# Patient Record
Sex: MALE | Race: WHITE | NOT HISPANIC OR LATINO | Employment: FULL TIME | URBAN - METROPOLITAN AREA
[De-identification: names, ages, dates, MRNs, and addresses within clinical notes are randomized per-mention and may not be internally consistent; named-entity substitution may affect disease eponyms.]

---

## 2017-03-29 ENCOUNTER — OFFICE VISIT (OUTPATIENT)
Dept: MEDICAL GROUP | Age: 67
End: 2017-03-29
Payer: COMMERCIAL

## 2017-03-29 VITALS
OXYGEN SATURATION: 98 % | HEIGHT: 73 IN | HEART RATE: 95 BPM | RESPIRATION RATE: 16 BRPM | WEIGHT: 171.6 LBS | DIASTOLIC BLOOD PRESSURE: 84 MMHG | TEMPERATURE: 97.6 F | SYSTOLIC BLOOD PRESSURE: 148 MMHG | BODY MASS INDEX: 22.74 KG/M2

## 2017-03-29 DIAGNOSIS — M19.90 ARTHRITIS: ICD-10-CM

## 2017-03-29 DIAGNOSIS — E89.0 POSTSURGICAL HYPOTHYROIDISM: ICD-10-CM

## 2017-03-29 DIAGNOSIS — Z13.220 SCREENING FOR LIPID DISORDERS: ICD-10-CM

## 2017-03-29 PROCEDURE — 99214 OFFICE O/P EST MOD 30 MIN: CPT | Performed by: INTERNAL MEDICINE

## 2017-03-29 ASSESSMENT — PAIN SCALES - GENERAL: PAINLEVEL: NO PAIN

## 2017-03-29 ASSESSMENT — PATIENT HEALTH QUESTIONNAIRE - PHQ9: CLINICAL INTERPRETATION OF PHQ2 SCORE: 0

## 2017-03-29 NOTE — ASSESSMENT & PLAN NOTE
Patient reported that he started noticed mild achy pain on both knees for a few months. He reported that his ski a lot and he skis in advance level. He reported achy on the foci of the knee after exercise. Pain increases in the morning when he wakes up. He takes over-the-counter aspirin 300 mg as needed when he has pain. Pain improved with aspirin. He does not like to take medication in regular basis. And he stated that his pain is not severe enough to be on medication, but he wants to discuss this issue with me. He denies side effects from taking aspirin.

## 2017-03-29 NOTE — MR AVS SNAPSHOT
"        Noe Burr   3/29/2017 8:20 AM   Office Visit   MRN: [id number]    Department:  [address] [place] [address]   Dept Phone:  [phone]    Description:  Male : 1950   Provider:  Shari Sheppard M.D.           Reason for Visit     Follow-Up lab review      Allergies as of 3/29/2017     No Known Allergies      You were diagnosed with     Arthritis   [261783]       Postsurgical hypothyroidism   [244.0.ICD-9-CM]   Well-controlled. Continue current regimen. We will recheck TSH and free T4 in 5 months.    Elevated BP   [799468]       Screening for lipid disorders   [0491898]         Vital Signs     Blood Pressure Pulse Temperature Respirations Height Weight    148/84 mmHg 95 36.4 °C (97.6 °F) 16 1.854 m (6' 0.99\") 77.837 kg (171 lb 9.6 oz)    Body Mass Index Oxygen Saturation Smoking Status             22.64 kg/m2 98% Never Smoker          Basic Information     Date Of Birth Sex Race Ethnicity Preferred Language    1950 Male White Non- English      Your appointments     Mar 29, 2018  8:20 AM   ANNUAL EXAM PREVENTATIVE with Shari Sheppard M.D.   [address])    [address] [place] [id number] [phone]              Problem List              ICD-10-CM Priority Class Noted - Resolved    Postsurgical hypothyroidism E89.0   2016 - Present    Elevated BP I10   2016 - Present    Patient travels Z78.9   2016 - Present    Actinic keratosis L57.0   2016 - Present    Seborrheic keratosis L82.1   2016 - Present    Arthritis M19.90   3/29/2017 - Present      Health Maintenance        Date Due Completion Dates    COLONOSCOPY 10/13/1999 ---    IMM ZOSTER VACCINE 10/13/2009 ---    IMM INFLUENZA (1) 2016    IMM PNEUMOCOCCAL 65+ (ADULT) LOW/MEDIUM RISK SERIES (2 of 2 - PCV13) 2017    IMM DTaP/Tdap/Td Vaccine (2 - Td) 3/19/2025 3/19/2015, 2009            Current Immunizations     Hepatitis A Vaccine, Adult " 5/20/2002, 9/20/2001    Hepatitis B Vaccine Recombivax (Adol/Adult) 12/30/2011, 8/10/2011, 6/14/2011    IPV 6/14/2011    Influenza Vaccine Quad Inj (Preserved) 11/5/2010    Meningococcal Conjugate Vaccine MCV4 (Menactra) 6/14/2011    Pneumococcal polysaccharide vaccine (PPSV-23) 4/19/2016 11:51 AM    TD Vaccine 12/4/2009    Tdap Vaccine 3/19/2015    Typhoid Vaccine 5/5/2016    Typhoid Vaccine (Oral) 6/14/2011      Below and/or attached are the medications your provider expects you to take. Review all of your home medications and newly ordered medications with your provider and/or pharmacist. Follow medication instructions as directed by your provider and/or pharmacist. Please keep your medication list with you and share with your provider. Update the information when medications are discontinued, doses are changed, or new medications (including over-the-counter products) are added; and carry medication information at all times in the event of emergency situations     Allergies:  No Known Allergies          Medications  Valid as of: March 29, 2017 -  8:55 AM    Generic Name Brand Name Tablet Size Instructions for use    Levothyroxine Sodium (Tab) SYNTHROID 150 MCG Take 1 Tab by mouth every 48 hours.        Levothyroxine Sodium (Tab) SYNTHROID 175 MCG Take 1 Tab by mouth every 48 hours.        .                 Medicines prescribed today were sent to:     [facility] [address] [facility] [place] - [address] [facility]    [address] [facility] [address]    Phone: [phone] Fax: [phone]    Open 24 Hours?: No      Medication refill instructions:       If your prescription bottle indicates you have medication refills left, it is not necessary to call your provider’s office. Please contact your pharmacy and they will refill your medication.    If your prescription bottle indicates you do not have any refills left, you may request refills at any time through one of the following ways: The online [employer] system (except Urgent  Care), by calling your provider’s office, or by asking your pharmacy to contact your provider’s office with a refill request. Medication refills are processed only during regular business hours and may not be available until the next business day. Your provider may request additional information or to have a follow-up visit with you prior to refilling your medication.   *Please Note: Medication refills are assigned a new Rx number when refilled electronically. Your pharmacy may indicate that no refills were authorized even though a new prescription for the same medication is available at the pharmacy. Please request the medicine by name with the pharmacy before contacting your provider for a refill.        Your To Do List     Future Labs/Procedures Complete By Expires    CBC WITH DIFFERENTIAL  As directed 3/30/2018    COMP METABOLIC PANEL  As directed 3/30/2018    FREE THYROXINE  As directed 3/30/2018    LIPID PROFILE  As directed 3/30/2018    TSH  As directed 3/30/2018         MyChart Access Code: Activation code not generated  Current [employer] Status: Active

## 2017-03-29 NOTE — ASSESSMENT & PLAN NOTE
Patient has high blood pressure, mostly in the clinic. He brought his blood pressure readings from home which is fluctuated between 120-135, in systole and 60-90 and diastole. He is not interested to take any antihypertensive medication. He wants to continue to manage his blood pressure with diet and exercise. His recent blood test was stable and normal and chemistry panel except slightly increased BUN/creatinine ratio on 3/15/17.

## 2017-03-29 NOTE — PROGRESS NOTES
Subjective:   Issa Velasquez is a 66 y.o. male here today for evaluation and management of:      Arthritis  Patient reported that he started noticed mild achy pain on both knees for a few months. He reported that his ski a lot and he skis in advance level. He reported achy on the foci of the knee after exercise. Pain increases in the morning when he wakes up. He takes over-the-counter aspirin 300 mg as needed when he has pain. Pain improved with aspirin. He does not like to take medication in regular basis. And he stated that his pain is not severe enough to be on medication, but he wants to discuss this issue with me. He denies side effects from taking aspirin.    Elevated BP  Patient has high blood pressure, mostly in the clinic. He brought his blood pressure readings from home which is fluctuated between 120-135, in systole and 60-90 and diastole. He is not interested to take any antihypertensive medication. He wants to continue to manage his blood pressure with diet and exercise. His recent blood test was stable and normal and chemistry panel except slightly increased BUN/creatinine ratio on 3/15/17.    Postsurgical hypothyroidism  Patient reported that he is taking levothyroxine 150 µg every morning 3 times a week alternating with levothyroxine 175 µg 3 times a week. He denies side effects from taking levothyroxine with current regimens. He is in euthyroid state. His TSH is within normal. His free T4 was slightly high on recent blood test on 3/15/17. Patient wants to continue levothyroxine with same dose currently.    Results for ISSA VELASQUEZ (MRN [id number]) as of 3/29/2017 09:13   Ref. Range 3/15/2017 08:10   TSH Latest Ref Range: 0.450-4.500 uIU/mL 1.560   Free T-4 Latest Ref Range: 0.82-1.77 ng/dL 1.92 (H)          Current medicines (including changes today)  Current Outpatient Prescriptions   Medication Sig Dispense Refill   • levothyroxine (SYNTHROID) 150 MCG Tab Take 1 Tab by mouth every 48  "hours. 90 Tab 1   • levothyroxine (SYNTHROID) 175 MCG Tab Take 1 Tab by mouth every 48 hours. 90 Tab 1     No current facility-administered medications for this visit.     He  has a past medical history of Hypothyroidism.    ROS   No chest pain, no shortness of breath, no abdominal pain       Objective:     Blood pressure 148/84, pulse 95, temperature 36.4 °C (97.6 °F), resp. rate 16, height 1.854 m (6' 0.99\"), weight 77.837 kg (171 lb 9.6 oz), SpO2 98 %. Body mass index is 22.64 kg/(m^2).   Physical Exam:  General: Alert, oriented and no acute distress.  Eye contact is good, speech goal directed, affect calm  HEENT: conjunctiva non-injected, sclera non-icteric.  Oral mucous membranes pink and moist with no lesions.  Pinna normal. TM pearly gray.   Neck No supraclavicular, submandibular, submental lymphadenopathy or masses in the neck or supraclavicular regions.  Lungs: Normal respiratory effort, clear to auscultation bilaterally with good excursion.  CV: regular rate and rhythm. No murmurs. No carotid bruits.  Abdomen: soft, non distended, nontender, No CVAT, Bowel sound normal.  Ext: no edema, color normal, vascularity normal, temperature normal  Musculoskeletal exam: Non tenderness on palpation of both knees, no effusion noticed on knees exam.      Assessment and Plan:   The following treatment plan was discussed     1. Arthritis  - Mild discomfort does not want to take medication daily. Discussed over-the-counter Tylenol arthritis, or aspirin or ibuprofen only as needed for pain.  - Advised to take aspirin or ibuprofen with food and recommended to stop taking NSAIDs or aspirin if he has abdominal pain or blood in stool or black tarry stool.  - Advised to try icy hot or Bengay cream, heat pad, stretching exercises.  - Consider x-ray if symptoms worsen.  - CBC WITH DIFFERENTIAL; Future  - COMP METABOLIC PANEL; Future    2. Postsurgical hypothyroidism  - Well-controlled. Continue current regimens. Recheck lab 1-2 " weeks before next follow up visit.  - CBC WITH DIFFERENTIAL; Future  - LIPID PROFILE; Future  - TSH; Future  - FREE THYROXINE; Future    3. Elevated BP  - Patient declined to take medications. He insists to control his blood pressure with diet and exercise. He is advised to closely monitor his blood pressure and heart rate at home.  - He is advised to return to clinic if his blood pressure is persistently higher than 140/90.  - CBC WITH DIFFERENTIAL; Future  - COMP METABOLIC PANEL; Future    4. Screening for lipid disorders  - Advised to eat low fat, low carbohydrate and high fiber diet as well as do cardio physical exercise regularly.   - LIPID PROFILE; Future        Health Maintenance: Discussed screening colon cancer with patient. Reprinted GI consultant referral information to patient. Patient stated that he is going to call GI consult to schedule for colonoscopy.  Patient will return to clinic next month for Prevnar 13 vaccine.    Followup: Return in about 1 year (around 3/29/2018), or if symptoms worsen or fail to improve, for postsurgical hypothyroid, elevated blood pressure, arthritis, lab review.      Please note that this dictation was created using voice recognition software. I have made every reasonable attempt to correct obvious errors, but I expect that there may have unintended errors in text, spelling, punctuation, or grammar that I did not discover.

## 2017-04-24 ENCOUNTER — TELEPHONE (OUTPATIENT)
Dept: MEDICAL GROUP | Age: 67
End: 2017-04-24

## 2017-04-24 DIAGNOSIS — Z23 NEED FOR VACCINATION: ICD-10-CM

## 2017-04-24 NOTE — TELEPHONE ENCOUNTER
Patient is on the MA Schedule 04/26/2017 for [id number] vaccine/injection.    SPECIFIC Action To Be Taken: Orders pending, please sign.

## 2017-04-26 ENCOUNTER — NON-PROVIDER VISIT (OUTPATIENT)
Dept: MEDICAL GROUP | Age: 67
End: 2017-04-26
Payer: COMMERCIAL

## 2017-04-26 DIAGNOSIS — Z23 NEED FOR VACCINATION: ICD-10-CM

## 2017-04-26 PROCEDURE — 90670 PCV13 VACCINE IM: CPT | Performed by: INTERNAL MEDICINE

## 2017-04-26 PROCEDURE — 99999 PR NO CHARGE: CPT | Performed by: INTERNAL MEDICINE

## 2017-04-26 PROCEDURE — 90471 IMMUNIZATION ADMIN: CPT | Performed by: INTERNAL MEDICINE

## 2017-04-26 NOTE — MR AVS SNAPSHOT
Noe Burr   2017 8:30 AM   Non-Provider Visit   MRN: [id number]    Department:  [address] [place] [address]   Dept Phone:  [phone]    Description:  Male : 1950   Provider:  CHRISTOPHER MASSEY MA           Reason for Visit     Immunizations PCV13      Allergies as of 2017     No Known Allergies      You were diagnosed with     Need for vaccination   [[id number]]         Vital Signs     Smoking Status                   Never Smoker            Basic Information     Date Of Birth Sex Race Ethnicity Preferred Language    1950 Male White Non- English      Your appointments     2017  8:30 AM   Non Provider 1 with CHRISTOPHER MASSEY MA   [address])    [facility] [place] [address] [phone]           You will be receiving a confirmation call a few days before your appointment from our automated call confirmation system.            Mar 29, 2018  8:20 AM   ANNUAL EXAM PREVENTATIVE with Shari Sheppard M.D.   [address])    [facility] [place] [address] [phone]              Problem List              ICD-10-CM Priority Class Noted - Resolved    Postsurgical hypothyroidism E89.0   2016 - Present    Elevated BP I10   2016 - Present    Patient travels Z78.9   2016 - Present    Actinic keratosis L57.0   2016 - Present    Seborrheic keratosis L82.1   2016 - Present    Arthritis M19.90   3/29/2017 - Present      Health Maintenance        Date Due Completion Dates    COLONOSCOPY 10/13/1999 ---    IMM ZOSTER VACCINE 10/13/2009 ---    IMM DTaP/Tdap/Td Vaccine (2 - Td) 3/19/2025 3/19/2015, 2009            Current Immunizations     13-VALENT PCV PREVNAR 2017,  Deferred (Insufficient Supply)    Hepatitis A Vaccine, Adult 2002, 2001    Hepatitis B Vaccine Recombivax (Adol/Adult) 2011, 8/10/2011, 2011    IPV 2011    Influenza Vaccine Quad Inj  (Preserved) 11/5/2010    Meningococcal Conjugate Vaccine MCV4 (Menactra) 6/14/2011    Pneumococcal polysaccharide vaccine (PPSV-23) 4/19/2016 11:51 AM    TD Vaccine 12/4/2009    Tdap Vaccine 3/19/2015    Typhoid Vaccine 5/5/2016    Typhoid Vaccine (Oral) 6/14/2011      Below and/or attached are the medications your provider expects you to take. Review all of your home medications and newly ordered medications with your provider and/or pharmacist. Follow medication instructions as directed by your provider and/or pharmacist. Please keep your medication list with you and share with your provider. Update the information when medications are discontinued, doses are changed, or new medications (including over-the-counter products) are added; and carry medication information at all times in the event of emergency situations     Allergies:  No Known Allergies          Medications  Valid as of: April 26, 2017 -  8:19 AM    Generic Name Brand Name Tablet Size Instructions for use    Levothyroxine Sodium (Tab) SYNTHROID 150 MCG Take 1 Tab by mouth every 48 hours.        Levothyroxine Sodium (Tab) SYNTHROID 175 MCG Take 1 Tab by mouth every 48 hours.        .                 Medicines prescribed today were sent to:     [facility] [address] [facility], [place] - [address] [facility]    [address] [facility] [address]    Phone: [phone] Fax: [phone]    Open 24 Hours?: No      Medication refill instructions:       If your prescription bottle indicates you have medication refills left, it is not necessary to call your provider’s office. Please contact your pharmacy and they will refill your medication.    If your prescription bottle indicates you do not have any refills left, you may request refills at any time through one of the following ways: The online [employer] system (except Urgent Care), by calling your provider’s office, or by asking your pharmacy to contact your provider’s office with a refill request. Medication refills are  processed only during regular business hours and may not be available until the next business day. Your provider may request additional information or to have a follow-up visit with you prior to refilling your medication.   *Please Note: Medication refills are assigned a new Rx number when refilled electronically. Your pharmacy may indicate that no refills were authorized even though a new prescription for the same medication is available at the pharmacy. Please request the medicine by name with the pharmacy before contacting your provider for a refill.           [facility] Access Code: Activation code not generated  Current [facility] Status: Active

## 2017-04-26 NOTE — PROGRESS NOTES
"Noe Burr is a 66 y.o. male here for a non-provider visit for:   PREVNAR (PCV13) 2 of 2    Reason for immunization: continue or complete series started at the office  Immunization records indicate need for vaccine: Yes  Minimum interval has been met for this vaccine: Yes  ABN completed: Not Indicated    Order and dose verified by:   VIS Dated  11/2/15 was given to patient: Yes  All IAC Questionnaire questions were answered “No.\"    Patient tolerated injection and no adverse effects were observed or reported: Yes/Patient tolerated vaccine    Pt scheduled for next dose in series: No  "

## 2018-03-14 LAB
ALBUMIN SERPL-MCNC: 4.2 G/DL (ref 3.6–4.8)
ALBUMIN/GLOB SERPL: 2.2 {RATIO} (ref 1.2–2.2)
ALP SERPL-CCNC: 90 IU/L (ref 39–117)
ALT SERPL-CCNC: 11 IU/L (ref 0–44)
AST SERPL-CCNC: 20 IU/L (ref 0–40)
BASOPHILS # BLD AUTO: 0 X10E3/UL (ref 0–0.2)
BASOPHILS NFR BLD AUTO: 1 %
BILIRUB SERPL-MCNC: 1.4 MG/DL (ref 0–1.2)
BUN SERPL-MCNC: 26 MG/DL (ref 8–27)
BUN/CREAT SERPL: 25 (ref 10–24)
CALCIUM SERPL-MCNC: 8.8 MG/DL (ref 8.6–10.2)
CHLORIDE SERPL-SCNC: 98 MMOL/L (ref 96–106)
CHOLEST SERPL-MCNC: 153 MG/DL (ref 100–199)
CO2 SERPL-SCNC: 23 MMOL/L (ref 18–29)
CREAT SERPL-MCNC: 1.06 MG/DL (ref 0.76–1.27)
EOSINOPHIL # BLD AUTO: 0.2 X10E3/UL (ref 0–0.4)
EOSINOPHIL NFR BLD AUTO: 4 %
ERYTHROCYTE [DISTWIDTH] IN BLOOD BY AUTOMATED COUNT: 13.7 % (ref 12.3–15.4)
GFR SERPLBLD CREATININE-BSD FMLA CKD-EPI: 72 ML/MIN/1.73
GFR SERPLBLD CREATININE-BSD FMLA CKD-EPI: 83 ML/MIN/1.73
GLOBULIN SER CALC-MCNC: 1.9 G/DL (ref 1.5–4.5)
GLUCOSE SERPL-MCNC: 86 MG/DL (ref 65–99)
HCT VFR BLD AUTO: 46 % (ref 37.5–51)
HDLC SERPL-MCNC: 46 MG/DL
HGB BLD-MCNC: 15.4 G/DL (ref 13–17.7)
IMM GRANULOCYTES # BLD: 0 X10E3/UL (ref 0–0.1)
IMM GRANULOCYTES NFR BLD: 0 %
IMMATURE CELLS  115398: NORMAL
LABORATORY COMMENT REPORT: NORMAL
LDLC SERPL CALC-MCNC: 88 MG/DL (ref 0–99)
LYMPHOCYTES # BLD AUTO: 1.4 X10E3/UL (ref 0.7–3.1)
LYMPHOCYTES NFR BLD AUTO: 33 %
MCH RBC QN AUTO: 29.4 PG (ref 26.6–33)
MCHC RBC AUTO-ENTMCNC: 33.5 G/DL (ref 31.5–35.7)
MCV RBC AUTO: 88 FL (ref 79–97)
MONOCYTES # BLD AUTO: 0.6 X10E3/UL (ref 0.1–0.9)
MONOCYTES NFR BLD AUTO: 14 %
MORPHOLOGY BLD-IMP: NORMAL
NEUTROPHILS # BLD AUTO: 2.1 X10E3/UL (ref 1.4–7)
NEUTROPHILS NFR BLD AUTO: 48 %
NRBC BLD AUTO-RTO: NORMAL %
PLATELET # BLD AUTO: 194 X10E3/UL (ref 150–379)
POTASSIUM SERPL-SCNC: 4.2 MMOL/L (ref 3.5–5.2)
PROT SERPL-MCNC: 6.1 G/DL (ref 6–8.5)
RBC # BLD AUTO: 5.24 X10E6/UL (ref 4.14–5.8)
SODIUM SERPL-SCNC: 139 MMOL/L (ref 134–144)
T4 FREE SERPL-MCNC: 1.86 NG/DL (ref 0.82–1.77)
TRIGL SERPL-MCNC: 97 MG/DL (ref 0–149)
TSH SERPL DL<=0.005 MIU/L-ACNC: 0.64 UIU/ML (ref 0.45–4.5)
VLDLC SERPL CALC-MCNC: 19 MG/DL (ref 5–40)
WBC # BLD AUTO: 4.2 X10E3/UL (ref 3.4–10.8)

## 2018-03-29 ENCOUNTER — OFFICE VISIT (OUTPATIENT)
Dept: MEDICAL GROUP | Age: 68
End: 2018-03-29
Payer: COMMERCIAL

## 2018-03-29 VITALS
SYSTOLIC BLOOD PRESSURE: 150 MMHG | HEIGHT: 73 IN | BODY MASS INDEX: 22.72 KG/M2 | OXYGEN SATURATION: 95 % | HEART RATE: 88 BPM | TEMPERATURE: 98 F | WEIGHT: 171.4 LBS | DIASTOLIC BLOOD PRESSURE: 90 MMHG

## 2018-03-29 DIAGNOSIS — E89.0 POSTSURGICAL HYPOTHYROIDISM: ICD-10-CM

## 2018-03-29 DIAGNOSIS — I10 ESSENTIAL HYPERTENSION: ICD-10-CM

## 2018-03-29 DIAGNOSIS — Z78.9 PATIENT TRAVELS: ICD-10-CM

## 2018-03-29 PROCEDURE — 99214 OFFICE O/P EST MOD 30 MIN: CPT | Performed by: INTERNAL MEDICINE

## 2018-03-29 RX ORDER — LISINOPRIL 5 MG/1
5 TABLET ORAL DAILY
Qty: 90 TAB | Refills: 3 | Status: SHIPPED | OUTPATIENT
Start: 2018-03-29 | End: 2023-11-21

## 2018-03-29 RX ORDER — ATOVAQUONE AND PROGUANIL HYDROCHLORIDE 250; 100 MG/1; MG/1
1 TABLET, FILM COATED ORAL DAILY
Qty: 20 TAB | Refills: 0 | Status: SHIPPED | OUTPATIENT
Start: 2018-03-29 | End: 2023-11-21

## 2018-03-29 RX ORDER — CIPROFLOXACIN 500 MG/1
500 TABLET, FILM COATED ORAL 2 TIMES DAILY
Qty: 14 TAB | Refills: 0 | Status: SHIPPED | OUTPATIENT
Start: 2018-03-29 | End: 2023-11-21

## 2018-03-29 NOTE — ASSESSMENT & PLAN NOTE
Patient has high blood pressure consistently in the past 3 years. He has been trying to control his blood pressure with diet and exercise. His blood pressure remains high. He agreed to try low-dose lisinopril 5 mg daily. I reviewed potential side effects of lisinopril with patient. We will recheck CMP in 3 months.

## 2018-03-29 NOTE — PROGRESS NOTES
Subjective:   Issa Velasquez is a 67 y.o. male here today for evaluation and management of:      Postsurgical hypothyroidism  Patient is taking levothyroxine 150 µg 3 days a week and levothyroxine 175 µg 3 days a week. He has consistently high free T4 but normal TSH for past 3 years. He denies palpitation, weight loss, heat intolerance or cold intolerance, bowel changes. I reviewed blood tests with him in clinic today. Patient agreed to cut down levothyroxine dose to 150 micrograms 4 days a week and 175 µg 2 days a week. We will recheck thyroid function tests in 3 months.    Results for ISSA VELASQUEZ (MRN [id number]) as of 3/29/2018 09:18   Ref. Range 3/13/2018 08:03   TSH Latest Ref Range: 0.450 - 4.500 uIU/mL 0.642   Free T-4 Latest Ref Range: 0.82 - 1.77 ng/dL 1.86 (H)       Patient travels  Patient stated that he is going to [place] [facility] beds at the [facility] next month and then he will go to [place]. Patient requests to have Malarone for malaria prevention when he spends the time at [facility] for 7 days. . He used to take it in the past during his trip. He denies side effects from taking malarone. He also wants to have ciprofloxacin in case he has any diarrhea. He stated that he receive ciprofloxacin from traveling clinic in the past. He knows how to use it and he will cautiously use it. I review all of the potential side effect of ciprofloxacin and indications of ciprofloxacin with patient. I also reminded patient that not all the diarrhea needs to take antibiotic. Some of the EHEC diarrhea can get worse from taking antibiotic. Patient stated understanding and he insists to refill ciprofloxacin.    Essential hypertension  Patient has high blood pressure consistently in the past 3 years. He has been trying to control his blood pressure with diet and exercise. His blood pressure remains high. He agreed to try low-dose lisinopril 5 mg daily. I reviewed potential side effects of lisinopril with patient. We will recheck CMP in 3  "months.         Current medicines (including changes today)  Current Outpatient Prescriptions   Medication Sig Dispense Refill   • atovaquone-proguanil (MALARONE) 250-100 MG per tablet Take 1 Tab by mouth every day. 2 days before exposure and continue taking until 7 days after exposure. 20 Tab 0   • ciprofloxacin (CIPRO) 500 MG Tab Take 1 Tab by mouth 2 times a day. 14 Tab 0   • lisinopril (PRINIVIL) 5 MG Tab Take 1 Tab by mouth every day. 90 Tab 3   • levothyroxine (SYNTHROID) 150 MCG Tab Take 1 Tab by mouth every 48 hours. 90 Tab 1   • levothyroxine (SYNTHROID) 175 MCG Tab Take 1 Tab by mouth every 48 hours. 90 Tab 1     No current facility-administered medications for this visit.      He  has a past medical history of Hypothyroidism.    ROS   No chest pain, no shortness of breath, no abdominal pain       Objective:     Blood pressure 150/90, pulse 88, temperature 36.7 °C (98 °F), height 1.849 m (6' 0.8\"), weight 77.7 kg (171 lb 6.4 oz), SpO2 95 %. Body mass index is 22.74 kg/m².   Physical Exam:  General: Alert, oriented and no acute distress.  Eye contact is good, speech goal directed, affect calm  HEENT: conjunctiva non-injected, sclera non-icteric.  Oral mucous membranes pink and moist with no lesions.  Pinna normal.  Lungs: Normal respiratory effort, clear to auscultation bilaterally with good excursion.  CV: regular rate and rhythm. No murmurs.   Abdomen: soft, non distended, nontender, Bowel sound normal.  Ext: no edema, color normal, vascularity normal, temperature normal      Assessment and Plan:   The following treatment plan was discussed     1. Patient travels  - Refill Malarone and Ciprofloxacin. Reviewed the use and side effects of malarone and ciprofloxacin with patient. See detailed discussion in history of present illness.  - atovaquone-proguanil (MALARONE) 250-100 MG per tablet; Take 1 Tab by mouth every day. 2 days before exposure and continue taking until 7 days after exposure.  Dispense: 20 " Tab; Refill: 0  - ciprofloxacin (CIPRO) 500 MG Tab; Take 1 Tab by mouth 2 times a day.  Dispense: 14 Tab; Refill: 0    2. Postsurgical hypothyroidism  - Free T4 is slightly high in past 3 years. Discussed to adjust the dose of levothyroxine.  - Patient will cut down levothyroxine to 150 micrograms 4 days a week and 175 µg 2 days a week.  - TSH; Future  - FREE THYROXINE; Future    3. Essential hypertension  - Will try lisinopril 5 mg daily. Reviewed potential side effect of lisinopril.  - Recommend to monitor blood pressure and heart rate at home.  - Patient can increase the dose of lisinopril to 10 mg daily if his blood pressure persistently above 150/90. Patient is advised to cut down lisinopril to 2.5 mg if his blood pressure is lower than 110/60 with lisinopril 5 mg daily.  - Patient is advised to stop taking lisinopril. If he has any side effects.   - Recheck lab in 3 months.  - lisinopril (PRINIVIL) 5 MG Tab; Take 1 Tab by mouth every day.  Dispense: 90 Tab; Refill: 3  - COMP METABOLIC PANEL; Future        Followup: Return in about 3 months (around 6/29/2018), or if symptoms worsen or fail to improve, for hypothyroid, hypertension, lab review.      Please note that this dictation was created using voice recognition software. I have made every reasonable attempt to correct obvious errors, but I expect that there may have unintended errors in text, spelling, punctuation, or grammar that I did not discover.

## 2018-03-29 NOTE — ASSESSMENT & PLAN NOTE
Patient is taking levothyroxine 150 µg 3 days a week and levothyroxine 175 µg 3 days a week. He has consistently high free T4 but normal TSH for past 3 years. He denies palpitation, weight loss, heat intolerance or cold intolerance, bowel changes. I reviewed blood tests with him in clinic today. Patient agreed to cut down levothyroxine dose to 150 micrograms 4 days a week and 175 µg 2 days a week. We will recheck thyroid function tests in 3 months.    Results for ISSA VELASQUEZ (MRN [id number]) as of 3/29/2018 09:18   Ref. Range 3/13/2018 08:03   TSH Latest Ref Range: 0.450 - 4.500 uIU/mL 0.642   Free T-4 Latest Ref Range: 0.82 - 1.77 ng/dL 1.86 (H)

## 2018-03-29 NOTE — ASSESSMENT & PLAN NOTE
Patient stated that he is going to [facility] next month and then he will go to [place]. Patient requests to have Malarone for malaria prevention when he spends the time at [facility] for 7 days. . He used to take it in the past during his trip. He denies side effects from taking malarone. He also wants to have ciprofloxacin in case he has any diarrhea. He stated that he receive ciprofloxacin from traveling clinic in the past. He knows how to use it and he will cautiously use it. I review all of the potential side effect of ciprofloxacin and indications of ciprofloxacin with patient. I also reminded patient that not all the diarrhea needs to take antibiotic. Some of the [facility] diarrhea can get worse from taking antibiotic. Patient stated understanding and he insists to refill ciprofloxacin.

## 2018-05-07 DIAGNOSIS — E89.0 POSTSURGICAL HYPOTHYROIDISM: ICD-10-CM

## 2018-05-07 RX ORDER — LEVOTHYROXINE SODIUM 175 UG/1
TABLET ORAL
Qty: 45 TAB | Refills: 3 | Status: ON HOLD | OUTPATIENT
Start: 2018-05-07 | End: 2023-11-29

## 2018-05-07 RX ORDER — LEVOTHYROXINE SODIUM 0.15 MG/1
TABLET ORAL
Qty: 45 TAB | Refills: 3 | Status: ON HOLD | OUTPATIENT
Start: 2018-05-07 | End: 2023-11-29

## 2018-07-03 LAB
ALBUMIN SERPL-MCNC: 4.1 G/DL (ref 3.6–4.8)
ALBUMIN/GLOB SERPL: 1.6 {RATIO} (ref 1.2–2.2)
ALP SERPL-CCNC: 84 IU/L (ref 39–117)
ALT SERPL-CCNC: 9 IU/L (ref 0–44)
AST SERPL-CCNC: 16 IU/L (ref 0–40)
BILIRUB SERPL-MCNC: 1.3 MG/DL (ref 0–1.2)
BUN SERPL-MCNC: 23 MG/DL (ref 8–27)
BUN/CREAT SERPL: 23 (ref 10–24)
CALCIUM SERPL-MCNC: 8.9 MG/DL (ref 8.6–10.2)
CHLORIDE SERPL-SCNC: 103 MMOL/L (ref 96–106)
CO2 SERPL-SCNC: 23 MMOL/L (ref 20–29)
CREAT SERPL-MCNC: 1.01 MG/DL (ref 0.76–1.27)
GLOBULIN SER CALC-MCNC: 2.5 G/DL (ref 1.5–4.5)
GLUCOSE SERPL-MCNC: 93 MG/DL (ref 65–99)
IF AFRICAN AMERICAN  100797: 88 ML/MIN/1.73
IF NON AFRICAN AMER 100791: 76 ML/MIN/1.73
POTASSIUM SERPL-SCNC: 4.3 MMOL/L (ref 3.5–5.2)
PROT SERPL-MCNC: 6.6 G/DL (ref 6–8.5)
SODIUM SERPL-SCNC: 140 MMOL/L (ref 134–144)
T4 FREE SERPL-MCNC: 1.82 NG/DL (ref 0.82–1.77)
TSH SERPL DL<=0.005 MIU/L-ACNC: 0.53 UIU/ML (ref 0.45–4.5)

## 2018-07-09 ENCOUNTER — APPOINTMENT (OUTPATIENT)
Dept: MEDICAL GROUP | Age: 68
End: 2018-07-09
Payer: COMMERCIAL

## 2018-11-30 NOTE — ASSESSMENT & PLAN NOTE
[address] [address]      PATIENT NAME:  TASHA MIRANDA   YOB: 1942   MRN:  [id number]   ATTENDING PHYSICIAN:  Jose Luis Ferguson M.D.   ROOM:     DICTATING PHYSICIAN:  Jose Luis Ferguson M.D.   UNIT#/ACCOUNT#:  [id number]   DATE OF SURGERY:  10/23/2018      PREOPERATIVE DIAGNOSIS(ES):  Screening colonoscopy, anemia.      POSTOPERATIVE DIAGNOSIS(ES):  Diverticulosis, sigmoid polyp, internal   hemorrhoids, and erosive gastritis.       OPERATION:     1. Colonoscopy, polypectomy.   2. EGD, biopsies.      SURGEON:  Jose Luis Ferguson M.D.      FINDINGS:  The patient presented with anemia.  She never had a colonoscopy.   She takes Advil p.r.n. for headaches and has taken other nonsteroidal   antiinflammatory drugs.       During colonoscopy, a small sigmoid polyp was removed.  Left colonic   diverticulosis and internal hemorrhoids were noted.       EGD showed erosive gastritis, which probably represents the bleeding source.      DESCRIPTION OF PROCEDURE:  An informed consent was obtained and the patient   was placed into left lateral position.  She was sedated with fentanyl 0.1 mg   and Versed 5 mg IV.  Time spent for moderate sedation was 45 minutes.       Colonoscopy was performed using an Olympus videocolonoscope, which was   introduced into the rectum.  Internal hemorrhoids were moderately filled.   Rectal mucosa looked normal.  In the distal sigmoid colon and diverticula   were noted.  The instrument was advanced in the descending, transverse, and   ascending colon into the cecum.  After washing, the ileocecal region looked   normal.  The right and left colon under careful inspection of the colonic   wall and again left colonic diverticula were seen.  The instrument was   retroflexed in the rectum and then removed.       EGD was performed using an Olympus videogastroscope, which was introduced   into  Patient reported that he is taking levothyroxine 150 µg every morning 3 times a week alternating with levothyroxine 175 µg 3 times a week. He denies side effects from taking levothyroxine with current regimens. He is in euthyroid state. His TSH is within normal. His free T4 was slightly high on recent blood test on 3/15/17. Patient wants to continue levothyroxine with same dose currently.    Results for ISSA VELASQUEZ (MRN [id number]) as of 3/29/2017 09:13   Ref. Range 3/15/2017 08:10   TSH Latest Ref Range: 0.450-4.500 uIU/mL 1.560   Free T-4 Latest Ref Range: 0.82-1.77 ng/dL 1.92 (H)      the esophagus.  Mucosa of the upper, mid, and lower esophagus was   intact.  At 38 cm, an irregular squamocolumnar junction was reached.  Distal   esophageal biopsies were obtained and slight friability was observed.  The   stomach was entered.  Erosions were noted in the gastric antrum and biopsies   were taken for a CLOtest to rule out presence of Helicobacter pylori.  The   spastic pylorus was passed.  First and second portions of the duodenum looked   normal.  The instrument was brought back into the stomach and it was   retroflexed.  Mucosa of the gastric corpus was slightly friable.  The   findings may be the result of nonsteroidal antiinflammatory medication.  The   gastric fundus looked normal, the cardia was unremarkable.  Gastric   secretions were slightly bile-stained, pH equals 3.  The stomach was deflated   and the instrument was withdrawn.  The patient tolerated the procedure well.   After observation until fully alert and stable, she returned home in good   condition accompanied by her daughter.       RECOMMENDATION:  The patient should avoid nonsteroidal antiinflammatory   drugs.  Instead, she may use acetaminophen in case of a headache.  She was   asked to start ranitidine 150 mg b.i.d. for 4 weeks and will be seen at the   office in followup.       Thank you for allowing me to participate in the care of this nice lady.         Jose Luis Ferguson M.D.      Author ID:  [id number]   MedQ / IJN: [id number] / Job#: [id number]   D: 10/23/2018 23:13:18   T: 10/23/2018 23:42:55         cc:         Alyssia Rawls M.D.

## 2019-05-11 ENCOUNTER — APPOINTMENT (OUTPATIENT)
Dept: RADIOLOGY | Facility: MEDICAL CENTER | Age: 69
DRG: 519 | End: 2019-05-11
Attending: EMERGENCY MEDICINE
Payer: COMMERCIAL

## 2019-05-11 ENCOUNTER — APPOINTMENT (OUTPATIENT)
Dept: RADIOLOGY | Facility: MEDICAL CENTER | Age: 69
DRG: 519 | End: 2019-05-11
Attending: ORTHOPAEDIC SURGERY
Payer: COMMERCIAL

## 2019-05-11 ENCOUNTER — HOSPITAL ENCOUNTER (INPATIENT)
Facility: MEDICAL CENTER | Age: 69
LOS: 11 days | DRG: 519 | End: 2019-05-22
Attending: EMERGENCY MEDICINE | Admitting: SURGERY
Payer: COMMERCIAL

## 2019-05-11 ENCOUNTER — ANESTHESIA (OUTPATIENT)
Dept: SURGERY | Facility: MEDICAL CENTER | Age: 69
DRG: 519 | End: 2019-05-11
Payer: COMMERCIAL

## 2019-05-11 ENCOUNTER — ANESTHESIA EVENT (OUTPATIENT)
Dept: SURGERY | Facility: MEDICAL CENTER | Age: 69
DRG: 519 | End: 2019-05-11
Payer: COMMERCIAL

## 2019-05-11 DIAGNOSIS — S32.810A MULTIPLE CLOSED FRACTURES OF PELVIS WITH STABLE DISRUPTION OF PELVIC RING, INITIAL ENCOUNTER (HCC): ICD-10-CM

## 2019-05-11 DIAGNOSIS — T14.90XA TRAUMA: ICD-10-CM

## 2019-05-11 DIAGNOSIS — G89.18 ACUTE POST-OPERATIVE PAIN: ICD-10-CM

## 2019-05-11 PROBLEM — E03.9 HYPOTHYROID: Status: ACTIVE | Noted: 2019-05-11

## 2019-05-11 PROBLEM — S32.9XXA PELVIC FRACTURE (HCC): Status: ACTIVE | Noted: 2019-05-11

## 2019-05-11 LAB
ALBUMIN SERPL BCP-MCNC: 4.1 G/DL (ref 3.2–4.9)
ALBUMIN/GLOB SERPL: 1.8 G/DL
ALP SERPL-CCNC: 88 U/L (ref 30–99)
ALT SERPL-CCNC: 39 U/L (ref 2–50)
ANION GAP SERPL CALC-SCNC: 9 MMOL/L (ref 0–11.9)
APTT PPP: 25.9 SEC (ref 24.7–36)
AST SERPL-CCNC: 66 U/L (ref 12–45)
BASOPHILS # BLD AUTO: 0.2 % (ref 0–1.8)
BASOPHILS # BLD: 0.03 K/UL (ref 0–0.12)
BILIRUB SERPL-MCNC: 1 MG/DL (ref 0.1–1.5)
BUN SERPL-MCNC: 28 MG/DL (ref 8–22)
CALCIUM SERPL-MCNC: 8.8 MG/DL (ref 8.5–10.5)
CHLORIDE SERPL-SCNC: 105 MMOL/L (ref 96–112)
CO2 SERPL-SCNC: 27 MMOL/L (ref 20–33)
CREAT SERPL-MCNC: 0.97 MG/DL (ref 0.5–1.4)
EKG IMPRESSION: NORMAL
EOSINOPHIL # BLD AUTO: 0.03 K/UL (ref 0–0.51)
EOSINOPHIL NFR BLD: 0.2 % (ref 0–6.9)
ERYTHROCYTE [DISTWIDTH] IN BLOOD BY AUTOMATED COUNT: 41.7 FL (ref 35.9–50)
GLOBULIN SER CALC-MCNC: 2.3 G/DL (ref 1.9–3.5)
GLUCOSE SERPL-MCNC: 103 MG/DL (ref 65–99)
HCT VFR BLD AUTO: 45.9 % (ref 42–52)
HGB BLD-MCNC: 15.7 G/DL (ref 14–18)
IMM GRANULOCYTES # BLD AUTO: 0.13 K/UL (ref 0–0.11)
IMM GRANULOCYTES NFR BLD AUTO: 1.1 % (ref 0–0.9)
INR PPP: 1.11 (ref 0.87–1.13)
LYMPHOCYTES # BLD AUTO: 0.57 K/UL (ref 1–4.8)
LYMPHOCYTES NFR BLD: 4.6 % (ref 22–41)
MCH RBC QN AUTO: 30 PG (ref 27–33)
MCHC RBC AUTO-ENTMCNC: 34.2 G/DL (ref 33.7–35.3)
MCV RBC AUTO: 87.8 FL (ref 81.4–97.8)
MONOCYTES # BLD AUTO: 1.25 K/UL (ref 0–0.85)
MONOCYTES NFR BLD AUTO: 10.1 % (ref 0–13.4)
NEUTROPHILS # BLD AUTO: 10.31 K/UL (ref 1.82–7.42)
NEUTROPHILS NFR BLD: 83.8 % (ref 44–72)
NRBC # BLD AUTO: 0 K/UL
NRBC BLD-RTO: 0 /100 WBC
PLATELET # BLD AUTO: 178 K/UL (ref 164–446)
PMV BLD AUTO: 9.4 FL (ref 9–12.9)
POTASSIUM SERPL-SCNC: 3.8 MMOL/L (ref 3.6–5.5)
PROT SERPL-MCNC: 6.4 G/DL (ref 6–8.2)
PROTHROMBIN TIME: 14.4 SEC (ref 12–14.6)
RBC # BLD AUTO: 5.23 M/UL (ref 4.7–6.1)
SODIUM SERPL-SCNC: 141 MMOL/L (ref 135–145)
WBC # BLD AUTO: 12.3 K/UL (ref 4.8–10.8)

## 2019-05-11 PROCEDURE — 700101 HCHG RX REV CODE 250: Performed by: ANESTHESIOLOGY

## 2019-05-11 PROCEDURE — 700105 HCHG RX REV CODE 258: Performed by: ANESTHESIOLOGY

## 2019-05-11 PROCEDURE — 96374 THER/PROPH/DIAG INJ IV PUSH: CPT

## 2019-05-11 PROCEDURE — 160042 HCHG SURGERY MINUTES - EA ADDL 1 MIN LEVEL 5: Performed by: ORTHOPAEDIC SURGERY

## 2019-05-11 PROCEDURE — 160031 HCHG SURGERY MINUTES - 1ST 30 MINS LEVEL 5: Performed by: ORTHOPAEDIC SURGERY

## 2019-05-11 PROCEDURE — A6402 STERILE GAUZE <= 16 SQ IN: HCPCS | Performed by: ORTHOPAEDIC SURGERY

## 2019-05-11 PROCEDURE — C1713 ANCHOR/SCREW BN/BN,TIS/BN: HCPCS | Performed by: ORTHOPAEDIC SURGERY

## 2019-05-11 PROCEDURE — 305948 HCHG GREEN TRAUMA ACT PRE-NOTIFY NO CC

## 2019-05-11 PROCEDURE — 85025 COMPLETE CBC W/AUTO DIFF WBC: CPT

## 2019-05-11 PROCEDURE — A4314 CATH W/DRAINAGE 2-WAY LATEX: HCPCS | Performed by: ORTHOPAEDIC SURGERY

## 2019-05-11 PROCEDURE — 72131 CT LUMBAR SPINE W/O DYE: CPT

## 2019-05-11 PROCEDURE — 85610 PROTHROMBIN TIME: CPT

## 2019-05-11 PROCEDURE — A6222 GAUZE <=16 IN NO W/SAL W/O B: HCPCS | Performed by: ORTHOPAEDIC SURGERY

## 2019-05-11 PROCEDURE — 700111 HCHG RX REV CODE 636 W/ 250 OVERRIDE (IP): Performed by: ANESTHESIOLOGY

## 2019-05-11 PROCEDURE — 500681: Performed by: ORTHOPAEDIC SURGERY

## 2019-05-11 PROCEDURE — 160035 HCHG PACU - 1ST 60 MINS PHASE I: Performed by: ORTHOPAEDIC SURGERY

## 2019-05-11 PROCEDURE — 110371 HCHG SHELL REV 272: Performed by: ORTHOPAEDIC SURGERY

## 2019-05-11 PROCEDURE — 160009 HCHG ANES TIME/MIN: Performed by: ORTHOPAEDIC SURGERY

## 2019-05-11 PROCEDURE — 72170 X-RAY EXAM OF PELVIS: CPT

## 2019-05-11 PROCEDURE — 72192 CT PELVIS W/O DYE: CPT

## 2019-05-11 PROCEDURE — 160036 HCHG PACU - EA ADDL 30 MINS PHASE I: Performed by: ORTHOPAEDIC SURGERY

## 2019-05-11 PROCEDURE — 85730 THROMBOPLASTIN TIME PARTIAL: CPT

## 2019-05-11 PROCEDURE — 700105 HCHG RX REV CODE 258: Performed by: SURGERY

## 2019-05-11 PROCEDURE — 160002 HCHG RECOVERY MINUTES (STAT): Performed by: ORTHOPAEDIC SURGERY

## 2019-05-11 PROCEDURE — 700111 HCHG RX REV CODE 636 W/ 250 OVERRIDE (IP): Performed by: EMERGENCY MEDICINE

## 2019-05-11 PROCEDURE — 501838 HCHG SUTURE GENERAL: Performed by: ORTHOPAEDIC SURGERY

## 2019-05-11 PROCEDURE — 80053 COMPREHEN METABOLIC PANEL: CPT

## 2019-05-11 PROCEDURE — 770006 HCHG ROOM/CARE - MED/SURG/GYN SEMI*

## 2019-05-11 PROCEDURE — 501445 HCHG STAPLER, SKIN DISP: Performed by: ORTHOPAEDIC SURGERY

## 2019-05-11 PROCEDURE — 700111 HCHG RX REV CODE 636 W/ 250 OVERRIDE (IP): Performed by: SURGERY

## 2019-05-11 PROCEDURE — 71045 X-RAY EXAM CHEST 1 VIEW: CPT

## 2019-05-11 PROCEDURE — 96375 TX/PRO/DX INJ NEW DRUG ADDON: CPT

## 2019-05-11 PROCEDURE — 160048 HCHG OR STATISTICAL LEVEL 1-5: Performed by: ORTHOPAEDIC SURGERY

## 2019-05-11 PROCEDURE — 93005 ELECTROCARDIOGRAM TRACING: CPT | Performed by: EMERGENCY MEDICINE

## 2019-05-11 PROCEDURE — 99285 EMERGENCY DEPT VISIT HI MDM: CPT

## 2019-05-11 DEVICE — IMPLANTABLE DEVICE: Type: IMPLANTABLE DEVICE | Site: PELVIS | Status: FUNCTIONAL

## 2019-05-11 DEVICE — SCREW CANN 7.3 32THRD 85MM - (2TX2=4): Type: IMPLANTABLE DEVICE | Site: PELVIS | Status: FUNCTIONAL

## 2019-05-11 DEVICE — WASHER ASIF 13.0 LG 219.99 (3TX6=18): Type: IMPLANTABLE DEVICE | Site: PELVIS | Status: FUNCTIONAL

## 2019-05-11 RX ORDER — DEXAMETHASONE SODIUM PHOSPHATE 4 MG/ML
INJECTION, SOLUTION INTRA-ARTICULAR; INTRALESIONAL; INTRAMUSCULAR; INTRAVENOUS; SOFT TISSUE PRN
Status: DISCONTINUED | OUTPATIENT
Start: 2019-05-11 | End: 2019-05-12 | Stop reason: SURG

## 2019-05-11 RX ORDER — POLYETHYLENE GLYCOL 3350 17 G/17G
1 POWDER, FOR SOLUTION ORAL 2 TIMES DAILY
Status: DISCONTINUED | OUTPATIENT
Start: 2019-05-11 | End: 2019-05-22 | Stop reason: HOSPADM

## 2019-05-11 RX ORDER — CHLORAL HYDRATE 500 MG
1000 CAPSULE ORAL DAILY
COMMUNITY
End: 2023-11-21

## 2019-05-11 RX ORDER — ENEMA 19; 7 G/133ML; G/133ML
1 ENEMA RECTAL
Status: DISCONTINUED | OUTPATIENT
Start: 2019-05-11 | End: 2019-05-22 | Stop reason: HOSPADM

## 2019-05-11 RX ORDER — MORPHINE SULFATE 4 MG/ML
4 INJECTION, SOLUTION INTRAMUSCULAR; INTRAVENOUS
Status: DISCONTINUED | OUTPATIENT
Start: 2019-05-11 | End: 2019-05-12

## 2019-05-11 RX ORDER — BISACODYL 10 MG
10 SUPPOSITORY, RECTAL RECTAL
Status: DISCONTINUED | OUTPATIENT
Start: 2019-05-11 | End: 2019-05-22 | Stop reason: HOSPADM

## 2019-05-11 RX ORDER — AMOXICILLIN 250 MG
1 CAPSULE ORAL NIGHTLY
Status: DISCONTINUED | OUTPATIENT
Start: 2019-05-11 | End: 2019-05-22 | Stop reason: HOSPADM

## 2019-05-11 RX ORDER — AMOXICILLIN 250 MG
1 CAPSULE ORAL
Status: DISCONTINUED | OUTPATIENT
Start: 2019-05-11 | End: 2019-05-22 | Stop reason: HOSPADM

## 2019-05-11 RX ORDER — LEVOTHYROXINE SODIUM 0.12 MG/1
175 TABLET ORAL EVERY EVENING
Status: ON HOLD | COMMUNITY
End: 2019-05-22

## 2019-05-11 RX ORDER — SODIUM CHLORIDE, SODIUM LACTATE, POTASSIUM CHLORIDE, CALCIUM CHLORIDE 600; 310; 30; 20 MG/100ML; MG/100ML; MG/100ML; MG/100ML
INJECTION, SOLUTION INTRAVENOUS CONTINUOUS
Status: DISCONTINUED | OUTPATIENT
Start: 2019-05-11 | End: 2019-05-12

## 2019-05-11 RX ORDER — ONDANSETRON 2 MG/ML
INJECTION INTRAMUSCULAR; INTRAVENOUS PRN
Status: DISCONTINUED | OUTPATIENT
Start: 2019-05-11 | End: 2019-05-12 | Stop reason: SURG

## 2019-05-11 RX ORDER — SODIUM CHLORIDE, SODIUM LACTATE, POTASSIUM CHLORIDE, CALCIUM CHLORIDE 600; 310; 30; 20 MG/100ML; MG/100ML; MG/100ML; MG/100ML
INJECTION, SOLUTION INTRAVENOUS
Status: DISCONTINUED | OUTPATIENT
Start: 2019-05-11 | End: 2019-05-12 | Stop reason: SURG

## 2019-05-11 RX ORDER — DOCUSATE SODIUM 100 MG/1
100 CAPSULE, LIQUID FILLED ORAL 2 TIMES DAILY
Status: DISCONTINUED | OUTPATIENT
Start: 2019-05-11 | End: 2019-05-22 | Stop reason: HOSPADM

## 2019-05-11 RX ORDER — CEFAZOLIN SODIUM 1 G/3ML
INJECTION, POWDER, FOR SOLUTION INTRAMUSCULAR; INTRAVENOUS PRN
Status: DISCONTINUED | OUTPATIENT
Start: 2019-05-11 | End: 2019-05-12 | Stop reason: SURG

## 2019-05-11 RX ORDER — MORPHINE SULFATE 4 MG/ML
2 INJECTION, SOLUTION INTRAMUSCULAR; INTRAVENOUS ONCE
Status: COMPLETED | OUTPATIENT
Start: 2019-05-11 | End: 2019-05-11

## 2019-05-11 RX ORDER — FAMOTIDINE 20 MG/1
20 TABLET, FILM COATED ORAL 2 TIMES DAILY
Status: DISCONTINUED | OUTPATIENT
Start: 2019-05-11 | End: 2019-05-12

## 2019-05-11 RX ORDER — ONDANSETRON 2 MG/ML
4 INJECTION INTRAMUSCULAR; INTRAVENOUS ONCE
Status: COMPLETED | OUTPATIENT
Start: 2019-05-11 | End: 2019-05-11

## 2019-05-11 RX ORDER — ONDANSETRON 2 MG/ML
4 INJECTION INTRAMUSCULAR; INTRAVENOUS EVERY 4 HOURS PRN
Status: DISCONTINUED | OUTPATIENT
Start: 2019-05-11 | End: 2019-05-22 | Stop reason: HOSPADM

## 2019-05-11 RX ADMIN — FENTANYL CITRATE 100 MCG: 50 INJECTION, SOLUTION INTRAMUSCULAR; INTRAVENOUS at 22:01

## 2019-05-11 RX ADMIN — FENTANYL CITRATE 50 MCG: 50 INJECTION, SOLUTION INTRAMUSCULAR; INTRAVENOUS at 23:07

## 2019-05-11 RX ADMIN — FENTANYL CITRATE 50 MCG: 50 INJECTION, SOLUTION INTRAMUSCULAR; INTRAVENOUS at 22:55

## 2019-05-11 RX ADMIN — SODIUM CHLORIDE, POTASSIUM CHLORIDE, SODIUM LACTATE AND CALCIUM CHLORIDE: 600; 310; 30; 20 INJECTION, SOLUTION INTRAVENOUS at 20:57

## 2019-05-11 RX ADMIN — DEXAMETHASONE SODIUM PHOSPHATE 4 MG: 4 INJECTION, SOLUTION INTRA-ARTICULAR; INTRALESIONAL; INTRAMUSCULAR; INTRAVENOUS; SOFT TISSUE at 22:20

## 2019-05-11 RX ADMIN — PROPOFOL 200 MG: 10 INJECTION, EMULSION INTRAVENOUS at 22:01

## 2019-05-11 RX ADMIN — EPHEDRINE SULFATE 10 MG: 50 INJECTION INTRAMUSCULAR; INTRAVENOUS; SUBCUTANEOUS at 22:09

## 2019-05-11 RX ADMIN — ROCURONIUM BROMIDE 20 MG: 10 INJECTION, SOLUTION INTRAVENOUS at 23:10

## 2019-05-11 RX ADMIN — SODIUM CHLORIDE, POTASSIUM CHLORIDE, SODIUM LACTATE AND CALCIUM CHLORIDE: 600; 310; 30; 20 INJECTION, SOLUTION INTRAVENOUS at 22:00

## 2019-05-11 RX ADMIN — ROCURONIUM BROMIDE 20 MG: 10 INJECTION, SOLUTION INTRAVENOUS at 22:39

## 2019-05-11 RX ADMIN — SUCCINYLCHOLINE CHLORIDE 80 MG: 20 INJECTION, SOLUTION INTRAMUSCULAR; INTRAVENOUS at 22:01

## 2019-05-11 RX ADMIN — ONDANSETRON 4 MG: 2 INJECTION INTRAMUSCULAR; INTRAVENOUS at 17:23

## 2019-05-11 RX ADMIN — ROCURONIUM BROMIDE 30 MG: 10 INJECTION, SOLUTION INTRAVENOUS at 22:07

## 2019-05-11 RX ADMIN — EPHEDRINE SULFATE 10 MG: 50 INJECTION INTRAMUSCULAR; INTRAVENOUS; SUBCUTANEOUS at 22:13

## 2019-05-11 RX ADMIN — ROCURONIUM BROMIDE 10 MG: 10 INJECTION, SOLUTION INTRAVENOUS at 23:41

## 2019-05-11 RX ADMIN — CEFAZOLIN 2 G: 330 INJECTION, POWDER, FOR SOLUTION INTRAMUSCULAR; INTRAVENOUS at 22:10

## 2019-05-11 RX ADMIN — FENTANYL CITRATE 50 MCG: 50 INJECTION, SOLUTION INTRAMUSCULAR; INTRAVENOUS at 23:10

## 2019-05-11 RX ADMIN — MORPHINE SULFATE 2 MG: 4 INJECTION INTRAVENOUS at 17:23

## 2019-05-11 RX ADMIN — EPHEDRINE SULFATE 10 MG: 50 INJECTION INTRAMUSCULAR; INTRAVENOUS; SUBCUTANEOUS at 22:31

## 2019-05-11 RX ADMIN — FAMOTIDINE 20 MG: 10 INJECTION INTRAVENOUS at 21:01

## 2019-05-11 RX ADMIN — ONDANSETRON 4 MG: 2 INJECTION INTRAMUSCULAR; INTRAVENOUS at 22:20

## 2019-05-11 ASSESSMENT — COPD QUESTIONNAIRES
DO YOU EVER COUGH UP ANY MUCUS OR PHLEGM?: NO/ONLY WITH OCCASIONAL COLDS OR INFECTIONS
HAVE YOU SMOKED AT LEAST 100 CIGARETTES IN YOUR ENTIRE LIFE: YES
COPD SCREENING SCORE: 2
DURING THE PAST 4 WEEKS HOW MUCH DID YOU FEEL SHORT OF BREATH: NONE/LITTLE OF THE TIME

## 2019-05-11 ASSESSMENT — LIFESTYLE VARIABLES
EVER_SMOKED: NEVER
EVER_SMOKED: YES
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
## 2019-05-12 PROBLEM — Z53.09 CONTRAINDICATION TO DEEP VEIN THROMBOSIS (DVT) PROPHYLAXIS: Status: ACTIVE | Noted: 2019-05-12

## 2019-05-12 LAB
ALBUMIN SERPL BCP-MCNC: 3.7 G/DL (ref 3.2–4.9)
ALBUMIN/GLOB SERPL: 1.9 G/DL
ALP SERPL-CCNC: 68 U/L (ref 30–99)
ALT SERPL-CCNC: 28 U/L (ref 2–50)
ANION GAP SERPL CALC-SCNC: 9 MMOL/L (ref 0–11.9)
AST SERPL-CCNC: 36 U/L (ref 12–45)
BASOPHILS # BLD AUTO: 0.1 % (ref 0–1.8)
BASOPHILS # BLD: 0.01 K/UL (ref 0–0.12)
BILIRUB SERPL-MCNC: 1.4 MG/DL (ref 0.1–1.5)
BUN SERPL-MCNC: 23 MG/DL (ref 8–22)
CALCIUM SERPL-MCNC: 7.9 MG/DL (ref 8.5–10.5)
CHLORIDE SERPL-SCNC: 106 MMOL/L (ref 96–112)
CO2 SERPL-SCNC: 24 MMOL/L (ref 20–33)
CREAT SERPL-MCNC: 0.9 MG/DL (ref 0.5–1.4)
EOSINOPHIL # BLD AUTO: 0 K/UL (ref 0–0.51)
EOSINOPHIL NFR BLD: 0 % (ref 0–6.9)
ERYTHROCYTE [DISTWIDTH] IN BLOOD BY AUTOMATED COUNT: 42.1 FL (ref 35.9–50)
GLOBULIN SER CALC-MCNC: 1.9 G/DL (ref 1.9–3.5)
GLUCOSE SERPL-MCNC: 144 MG/DL (ref 65–99)
HCT VFR BLD AUTO: 39.5 % (ref 42–52)
HGB BLD-MCNC: 12.8 G/DL (ref 14–18)
HGB BLD-MCNC: 13.5 G/DL (ref 14–18)
IMM GRANULOCYTES # BLD AUTO: 0.03 K/UL (ref 0–0.11)
IMM GRANULOCYTES NFR BLD AUTO: 0.3 % (ref 0–0.9)
LYMPHOCYTES # BLD AUTO: 0.44 K/UL (ref 1–4.8)
LYMPHOCYTES NFR BLD: 5 % (ref 22–41)
MCH RBC QN AUTO: 30 PG (ref 27–33)
MCHC RBC AUTO-ENTMCNC: 34.2 G/DL (ref 33.7–35.3)
MCV RBC AUTO: 87.8 FL (ref 81.4–97.8)
MONOCYTES # BLD AUTO: 0.95 K/UL (ref 0–0.85)
MONOCYTES NFR BLD AUTO: 10.7 % (ref 0–13.4)
NEUTROPHILS # BLD AUTO: 7.45 K/UL (ref 1.82–7.42)
NEUTROPHILS NFR BLD: 83.9 % (ref 44–72)
NRBC # BLD AUTO: 0 K/UL
NRBC BLD-RTO: 0 /100 WBC
PLATELET # BLD AUTO: 141 K/UL (ref 164–446)
PMV BLD AUTO: 9.1 FL (ref 9–12.9)
POTASSIUM SERPL-SCNC: 4.3 MMOL/L (ref 3.6–5.5)
PROT SERPL-MCNC: 5.6 G/DL (ref 6–8.2)
RBC # BLD AUTO: 4.5 M/UL (ref 4.7–6.1)
SODIUM SERPL-SCNC: 139 MMOL/L (ref 135–145)
WBC # BLD AUTO: 8.9 K/UL (ref 4.8–10.8)

## 2019-05-12 PROCEDURE — A9270 NON-COVERED ITEM OR SERVICE: HCPCS | Performed by: SURGERY

## 2019-05-12 PROCEDURE — 0QS234Z REPOSITION RIGHT PELVIC BONE WITH INTERNAL FIXATION DEVICE, PERCUTANEOUS APPROACH: ICD-10-PCS | Performed by: ORTHOPAEDIC SURGERY

## 2019-05-12 PROCEDURE — 3E0234Z INTRODUCTION OF SERUM, TOXOID AND VACCINE INTO MUSCLE, PERCUTANEOUS APPROACH: ICD-10-PCS | Performed by: SURGERY

## 2019-05-12 PROCEDURE — 700111 HCHG RX REV CODE 636 W/ 250 OVERRIDE (IP): Performed by: ORTHOPAEDIC SURGERY

## 2019-05-12 PROCEDURE — 700111 HCHG RX REV CODE 636 W/ 250 OVERRIDE (IP): Performed by: ANESTHESIOLOGY

## 2019-05-12 PROCEDURE — 72200 X-RAY EXAM SI JOINTS: CPT

## 2019-05-12 PROCEDURE — A9270 NON-COVERED ITEM OR SERVICE: HCPCS | Performed by: NURSE PRACTITIONER

## 2019-05-12 PROCEDURE — 97166 OT EVAL MOD COMPLEX 45 MIN: CPT

## 2019-05-12 PROCEDURE — 97162 PT EVAL MOD COMPLEX 30 MIN: CPT

## 2019-05-12 PROCEDURE — 700111 HCHG RX REV CODE 636 W/ 250 OVERRIDE (IP): Performed by: SURGERY

## 2019-05-12 PROCEDURE — 85018 HEMOGLOBIN: CPT

## 2019-05-12 PROCEDURE — 770006 HCHG ROOM/CARE - MED/SURG/GYN SEMI*

## 2019-05-12 PROCEDURE — 36415 COLL VENOUS BLD VENIPUNCTURE: CPT

## 2019-05-12 PROCEDURE — 700112 HCHG RX REV CODE 229: Performed by: SURGERY

## 2019-05-12 PROCEDURE — 90670 PCV13 VACCINE IM: CPT | Performed by: SURGERY

## 2019-05-12 PROCEDURE — 700102 HCHG RX REV CODE 250 W/ 637 OVERRIDE(OP): Performed by: ANESTHESIOLOGY

## 2019-05-12 PROCEDURE — 90471 IMMUNIZATION ADMIN: CPT

## 2019-05-12 PROCEDURE — 700111 HCHG RX REV CODE 636 W/ 250 OVERRIDE (IP): Performed by: NURSE PRACTITIONER

## 2019-05-12 PROCEDURE — 0QS134Z REPOSITION SACRUM WITH INTERNAL FIXATION DEVICE, PERCUTANEOUS APPROACH: ICD-10-PCS | Performed by: ORTHOPAEDIC SURGERY

## 2019-05-12 PROCEDURE — 80053 COMPREHEN METABOLIC PANEL: CPT

## 2019-05-12 PROCEDURE — 85025 COMPLETE CBC W/AUTO DIFF WBC: CPT

## 2019-05-12 PROCEDURE — 700102 HCHG RX REV CODE 250 W/ 637 OVERRIDE(OP): Performed by: SURGERY

## 2019-05-12 PROCEDURE — A9270 NON-COVERED ITEM OR SERVICE: HCPCS | Performed by: ANESTHESIOLOGY

## 2019-05-12 PROCEDURE — 700102 HCHG RX REV CODE 250 W/ 637 OVERRIDE(OP): Performed by: NURSE PRACTITIONER

## 2019-05-12 PROCEDURE — 700101 HCHG RX REV CODE 250: Performed by: ANESTHESIOLOGY

## 2019-05-12 RX ORDER — HALOPERIDOL 5 MG/ML
1 INJECTION INTRAMUSCULAR
Status: DISCONTINUED | OUTPATIENT
Start: 2019-05-12 | End: 2019-05-12 | Stop reason: HOSPADM

## 2019-05-12 RX ORDER — OXYCODONE HCL 5 MG/5 ML
10 SOLUTION, ORAL ORAL
Status: COMPLETED | OUTPATIENT
Start: 2019-05-12 | End: 2019-05-12

## 2019-05-12 RX ORDER — ACETAMINOPHEN 650 MG/1
650 SUPPOSITORY RECTAL EVERY 4 HOURS PRN
Status: DISCONTINUED | OUTPATIENT
Start: 2019-05-12 | End: 2019-05-22 | Stop reason: HOSPADM

## 2019-05-12 RX ORDER — SODIUM CHLORIDE, SODIUM LACTATE, POTASSIUM CHLORIDE, CALCIUM CHLORIDE 600; 310; 30; 20 MG/100ML; MG/100ML; MG/100ML; MG/100ML
INJECTION, SOLUTION INTRAVENOUS CONTINUOUS
Status: DISCONTINUED | OUTPATIENT
Start: 2019-05-12 | End: 2019-05-12 | Stop reason: HOSPADM

## 2019-05-12 RX ORDER — OXYCODONE HCL 5 MG/5 ML
5 SOLUTION, ORAL ORAL
Status: COMPLETED | OUTPATIENT
Start: 2019-05-12 | End: 2019-05-12

## 2019-05-12 RX ORDER — OXYCODONE HYDROCHLORIDE 5 MG/1
2.5-5 TABLET ORAL
Status: DISCONTINUED | OUTPATIENT
Start: 2019-05-12 | End: 2019-05-22 | Stop reason: HOSPADM

## 2019-05-12 RX ORDER — MEPERIDINE HYDROCHLORIDE 25 MG/ML
6.25 INJECTION INTRAMUSCULAR; INTRAVENOUS; SUBCUTANEOUS
Status: DISCONTINUED | OUTPATIENT
Start: 2019-05-12 | End: 2019-05-12 | Stop reason: HOSPADM

## 2019-05-12 RX ORDER — HYDROMORPHONE HYDROCHLORIDE 1 MG/ML
0.4 INJECTION, SOLUTION INTRAMUSCULAR; INTRAVENOUS; SUBCUTANEOUS
Status: DISCONTINUED | OUTPATIENT
Start: 2019-05-12 | End: 2019-05-12 | Stop reason: HOSPADM

## 2019-05-12 RX ORDER — ONDANSETRON 2 MG/ML
4 INJECTION INTRAMUSCULAR; INTRAVENOUS
Status: DISCONTINUED | OUTPATIENT
Start: 2019-05-12 | End: 2019-05-12 | Stop reason: HOSPADM

## 2019-05-12 RX ORDER — ACETAMINOPHEN 325 MG/1
650 TABLET ORAL EVERY 4 HOURS PRN
Status: DISCONTINUED | OUTPATIENT
Start: 2019-05-12 | End: 2019-05-22 | Stop reason: HOSPADM

## 2019-05-12 RX ORDER — DIPHENHYDRAMINE HYDROCHLORIDE 50 MG/ML
12.5 INJECTION INTRAMUSCULAR; INTRAVENOUS
Status: DISCONTINUED | OUTPATIENT
Start: 2019-05-12 | End: 2019-05-12 | Stop reason: HOSPADM

## 2019-05-12 RX ORDER — HYDROMORPHONE HYDROCHLORIDE 1 MG/ML
0.2 INJECTION, SOLUTION INTRAMUSCULAR; INTRAVENOUS; SUBCUTANEOUS
Status: DISCONTINUED | OUTPATIENT
Start: 2019-05-12 | End: 2019-05-12 | Stop reason: HOSPADM

## 2019-05-12 RX ORDER — IBUPROFEN 400 MG/1
400 TABLET ORAL EVERY 6 HOURS PRN
Status: DISCONTINUED | OUTPATIENT
Start: 2019-05-12 | End: 2019-05-22 | Stop reason: HOSPADM

## 2019-05-12 RX ORDER — CEFAZOLIN SODIUM 2 G/100ML
2 INJECTION, SOLUTION INTRAVENOUS EVERY 8 HOURS
Status: DISPENSED | OUTPATIENT
Start: 2019-05-12 | End: 2019-05-12

## 2019-05-12 RX ORDER — HYDROMORPHONE HYDROCHLORIDE 1 MG/ML
0.1 INJECTION, SOLUTION INTRAMUSCULAR; INTRAVENOUS; SUBCUTANEOUS
Status: DISCONTINUED | OUTPATIENT
Start: 2019-05-12 | End: 2019-05-12 | Stop reason: HOSPADM

## 2019-05-12 RX ADMIN — ENOXAPARIN SODIUM 30 MG: 100 INJECTION SUBCUTANEOUS at 18:08

## 2019-05-12 RX ADMIN — FENTANYL CITRATE 25 MCG: 50 INJECTION, SOLUTION INTRAMUSCULAR; INTRAVENOUS at 01:08

## 2019-05-12 RX ADMIN — CEFAZOLIN SODIUM 2 G: 2 INJECTION, SOLUTION INTRAVENOUS at 16:38

## 2019-05-12 RX ADMIN — FENTANYL CITRATE 25 MCG: 50 INJECTION, SOLUTION INTRAMUSCULAR; INTRAVENOUS at 01:02

## 2019-05-12 RX ADMIN — SUGAMMADEX 200 MG: 100 INJECTION, SOLUTION INTRAVENOUS at 00:21

## 2019-05-12 RX ADMIN — OXYCODONE HYDROCHLORIDE 10 MG: 5 SOLUTION ORAL at 01:01

## 2019-05-12 RX ADMIN — ONDANSETRON 4 MG: 2 INJECTION INTRAMUSCULAR; INTRAVENOUS at 05:23

## 2019-05-12 RX ADMIN — PROCHLORPERAZINE EDISYLATE 5 MG: 5 INJECTION INTRAMUSCULAR; INTRAVENOUS at 09:03

## 2019-05-12 RX ADMIN — DOCUSATE SODIUM 100 MG: 100 CAPSULE, LIQUID FILLED ORAL at 05:11

## 2019-05-12 RX ADMIN — FENTANYL CITRATE 50 MCG: 50 INJECTION, SOLUTION INTRAMUSCULAR; INTRAVENOUS at 01:13

## 2019-05-12 RX ADMIN — IBUPROFEN 400 MG: 400 TABLET, FILM COATED ORAL at 22:07

## 2019-05-12 RX ADMIN — LEVOTHYROXINE SODIUM 175 MCG: 25 TABLET ORAL at 18:08

## 2019-05-12 RX ADMIN — FAMOTIDINE 20 MG: 20 TABLET ORAL at 05:11

## 2019-05-12 RX ADMIN — OXYCODONE HYDROCHLORIDE 5 MG: 5 TABLET ORAL at 08:59

## 2019-05-12 RX ADMIN — PNEUMOCOCCAL 13-VALENT CONJUGATE VACCINE 0.5 ML: 2.2; 2.2; 2.2; 2.2; 2.2; 4.4; 2.2; 2.2; 2.2; 2.2; 2.2; 2.2; 2.2 INJECTION, SUSPENSION INTRAMUSCULAR at 17:11

## 2019-05-12 ASSESSMENT — COGNITIVE AND FUNCTIONAL STATUS - GENERAL
SUGGESTED CMS G CODE MODIFIER DAILY ACTIVITY: CK
STANDING UP FROM CHAIR USING ARMS: TOTAL
TOILETING: TOTAL
DRESSING REGULAR LOWER BODY CLOTHING: TOTAL
MOBILITY SCORE: 7
MOVING FROM LYING ON BACK TO SITTING ON SIDE OF FLAT BED: UNABLE
CLIMB 3 TO 5 STEPS WITH RAILING: TOTAL
MOBILITY SCORE: 8
CLIMB 3 TO 5 STEPS WITH RAILING: TOTAL
MOVING FROM LYING ON BACK TO SITTING ON SIDE OF FLAT BED: UNABLE
STANDING UP FROM CHAIR USING ARMS: A LOT
DRESSING REGULAR UPPER BODY CLOTHING: A LITTLE
WALKING IN HOSPITAL ROOM: TOTAL
DRESSING REGULAR LOWER BODY CLOTHING: TOTAL
TOILETING: A LOT
SUGGESTED CMS G CODE MODIFIER MOBILITY: CM
TURNING FROM BACK TO SIDE WHILE IN FLAT BAD: A LOT
DAILY ACTIVITIY SCORE: 16
DRESSING REGULAR UPPER BODY CLOTHING: A LITTLE
HELP NEEDED FOR BATHING: A LOT
TURNING FROM BACK TO SIDE WHILE IN FLAT BAD: UNABLE
DAILY ACTIVITIY SCORE: 14
SUGGESTED CMS G CODE MODIFIER MOBILITY: CM
WALKING IN HOSPITAL ROOM: TOTAL
MOVING TO AND FROM BED TO CHAIR: UNABLE
HELP NEEDED FOR BATHING: TOTAL
MOVING TO AND FROM BED TO CHAIR: A LOT
SUGGESTED CMS G CODE MODIFIER DAILY ACTIVITY: CK

## 2019-05-12 ASSESSMENT — ENCOUNTER SYMPTOMS
NAUSEA: 0
DEPRESSION: 0
SHORTNESS OF BREATH: 0
ABDOMINAL PAIN: 0
SENSORY CHANGE: 0
FOCAL WEAKNESS: 0
BACK PAIN: 0
CHILLS: 0
FEVER: 0
NECK PAIN: 0
VOMITING: 0
SPEECH CHANGE: 0
DOUBLE VISION: 0
MYALGIAS: 1

## 2019-05-12 ASSESSMENT — PATIENT HEALTH QUESTIONNAIRE - PHQ9
SUM OF ALL RESPONSES TO PHQ9 QUESTIONS 1 AND 2: 0
1. LITTLE INTEREST OR PLEASURE IN DOING THINGS: NOT AT ALL
2. FEELING DOWN, DEPRESSED, IRRITABLE, OR HOPELESS: NOT AT ALL

## 2019-05-12 ASSESSMENT — PAIN SCALES - GENERAL: PAIN_LEVEL: 4

## 2019-05-12 ASSESSMENT — GAIT ASSESSMENTS: GAIT LEVEL OF ASSIST: UNABLE TO PARTICIPATE

## 2019-05-12 ASSESSMENT — LIFESTYLE VARIABLES
ALCOHOL_USE: NO
SUBSTANCE_ABUSE: 0

## 2019-05-12 ASSESSMENT — ACTIVITIES OF DAILY LIVING (ADL): TOILETING: INDEPENDENT

## 2019-05-12 NOTE — ED NOTES
States last po intake approx 1300 today.  States pain minimal at present.  Resting comfortably in no acute distress with family at bedside.  Awaiting admit, aware of status.

## 2019-05-12 NOTE — ANESTHESIA QCDR
2019 [facility] Clinical Data Registry (for Quality Improvement)     Postoperative nausea/vomiting risk protocol (Adult = 18 yrs and Pediatric 3-17 yrs)- (430 and 463)  General inhalation anesthetic (NOT TIVA) with PONV risk factors: No  Provision of anti-emetic therapy with at least 2 different classes of agents: N/A  Patient DID NOT receive anti-emetic therapy and reason is documented in Medical Record: N/A    Multimodal Pain Management- (AQI59)  Patient undergoing Elective Surgery (i.e. Outpatient, or ASC, or Prescheduled Surgery prior to Hospital Admission): No  Use of Multimodal Pain Management, two or more drugs and/or interventions, NOT including systemic opioids: N/A  Exception: Documented allergy to multiple classes of analgesics: N/A    PACU assessment of acute postoperative pain prior to Anesthesia Care End- Applies to Patients Age = 18- (ABG7)  Initial PACU pain score is which of the following: < 7/10  Patient unable to report pain score: N/A    Post-anesthetic transfer of care checklist/protocol to PACU/ICU- (426 and 427)  Upon conclusion of case, patient transferred to which of the following locations: PACU/Non-ICU  Use of transfer checklist/protocol: Yes  Exclusion: Service Performed in Patient Hospital Room (and thus did not require transfer): N/A    PACU Reintubation- (AQI31)  General anesthesia requiring endotracheal intubation (ETT) along with subsequent extubation in OR or PACU: Yes  Required reintubation in the PACU: No   Extubation was a planned trial documented in the medical record prior to removal of the original airway device:  N/A    Unplanned admission to ICU related to anesthesia service up through end of PACU care- (MD51)  Unplanned admission to ICU (not initially anticipated at anesthesia start time): No

## 2019-05-12 NOTE — ANESTHESIA PROCEDURE NOTES
Airway  Date/Time: 5/11/2019 10:03 PM  Performed by: ALVERTO MONTANO  Authorized by: ALVERTO MONTANO     Location:  OR  Urgency:  Elective  Difficult Airway: No    Indications for Airway Management:  Anesthesia  Spontaneous Ventilation: absent    Sedation Level:  Deep  Preoxygenated: Yes    Patient Position:  Sniffing  Mask Difficulty Assessment:  0 - not attempted  Final Airway Type:  Endotracheal airway  Final Endotracheal Airway:  ETT  Cuffed: Yes    Technique Used for Successful ETT Placement:  Direct laryngoscopy  Insertion Site:  Oral  Blade Type:  Beverley  Laryngoscope Blade/Videolaryngoscope Blade Size:  3  ETT Size (mm):  7.0  Measured from:  Teeth  ETT to Teeth (cm):  22  Placement Verified by: auscultation and capnometry    Cormack-Lehane Classification:  Grade IIb - view of arytenoids or posterior of glottis only  Number of Attempts at Approach:  1

## 2019-05-12 NOTE — PROGRESS NOTES
· 2 RN skin check complete with  MIGUEL tobar.  · Devices in place SCDs.  · Skin assessed under devices yes.  · Confirmed pressure ulcers found none.  ·   · The following interventions in place pillows in use for support.    The patient has an abrasion to the back of the right side of the head. He has two abrasions to his forehead.  An abrasion to elbow and crook of elbow on right.  There is an abrasion to right index finger and right thumb.  The left elbow is red and flaky.

## 2019-05-12 NOTE — ASSESSMENT & PLAN NOTE
Systemic anticoagulation contraindicated secondary to elevated bleeding risk.  5/12 Initiate pharmacological DVT prophylaxis   RAP score
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
## 2019-05-12 NOTE — OR SURGEON
Immediate Post OP Note    PreOp Diagnosis: Bilateral posterior pelvic ring disruptions (right SI joint, left sacrum), Right anterior pelvic ring fractures    PostOp Diagnosis: same    Procedure(s):  INSERTION, SCREW, SACROILIAC JOINT - Wound Class: Clean    Surgeon(s):  Russell Jimenez M.D.    Anesthesiologist/Type of Anesthesia:  Anesthesiologist: Romaine Merchant M.D./General    Surgical Staff:  Circulator: Maine Mishra R.N.  Scrub Person: Janet Stewart Assist: Kristi Taylor CFA  Radiology Technologist: Eveline Estevez    Specimens removed if any:  * No specimens in log *    Estimated Blood Loss: minimal    Findings: see dictation    Complications: none known    PLAN:  --readmit trauma surgery postop  --NWB BLE  --okay to start lovenox in am  --continue SCDs  --PT/OT for wheelchair transfer training        5/12/2019 12:44 AM Russell Jimenez M.D.

## 2019-05-12 NOTE — ANESTHESIA TIME REPORT
Anesthesia Start and Stop Event Times     Date Time Event    5/11/2019 2200 Anesthesia Start    5/12/2019 0031 Anesthesia Stop        Responsible Staff  05/11/19 to 05/12/19    Name Role Begin Campbell Merchant M.D. Anesth 2200 0031        Preop Diagnosis (Free Text):  Pre-op Diagnosis     Pelvic fractures and SI joint injury        Preop Diagnosis (Codes):  Diagnosis Information     Diagnosis Code(s):         Post op Diagnosis  Pelvis fracture (HCC)      Premium Reason  E. Weekend    Comments:
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
## 2019-05-12 NOTE — PROGRESS NOTES
A&O, VSS, NUNN.  Dressing sites are CDI. Minimal pain present.  Pruett catheter in place to DD, moderate amount of yellow urine present.  Medicated for pain per mar prior to PT/OT.  PIV patent, SL.  POC discussed, pt agrees and verbalizes understanding.  Hourly rounding in place, call light is within reach.  Assessment completed as charted.

## 2019-05-12 NOTE — THERAPY
"Physical Therapy Evaluation completed.   Bed Mobility:  Supine to Sit: Moderate Assist  Transfers:  Mod assist for slide board transfer   Gait: Level Of Assist: Unable to Participate        Plan of Care: Will benefit from Physical Therapy 3 times per week  Discharge Recommendations: Equipment: Will Continue to Assess for Equipment Needs. Post-acute therapy Discharge to a transitional care facility for continued skilled therapy services.    See \"Rehab Therapy-Acute\" Patient Summary Report for complete documentation.       Pt is a 67 y/o male presenting with right SI joint disruption, right anterior pelvic fractures, left sacral fracture s/p percutaneous screw fixation. Pt with some disconnect as to the level of injury an how limiting BLE NWB precautions are. Pt with decreased activity tolerance due to pain but was able to tolerate slide board transfer to WC with mod assist. PT to continue working with Pt in acute setting to get more proficient with slide board to WC, but because of the lack of adequate set up at home, recommend post acute transitional placement prior to DC home with daughter.   "

## 2019-05-12 NOTE — ANESTHESIA POSTPROCEDURE EVALUATION
Patient: Myriam David    Procedure Summary     Date:  05/11/19 Room / Location:  [place] [address] / SURGERY [facility]    Anesthesia Start:  2200 Anesthesia Stop:  05/12/19 0031    Procedure:  INSERTION, SCREW, SACROILIAC JOINT (Bilateral Pelvis) Diagnosis:  (Pelvic fractures and SI joint injury)    Surgeon:  Russell Jimenez M.D. Responsible Provider:  Romaine Merchant M.D.    Anesthesia Type:  general ASA Status:  2 - Emergent          Final Anesthesia Type: general  Last vitals  BP   Blood Pressure : 135/83, NIBP: 116/66    Temp   37.8 °C (100 °F)    Pulse   Pulse: 81, Heart Rate (Monitored): 79   Resp   16    SpO2   93 %      Anesthesia Post Evaluation    Patient location during evaluation: PACU  Patient participation: complete - patient participated  Level of consciousness: awake and alert  Pain score: 4    Airway patency: patent  Anesthetic complications: no  Cardiovascular status: hemodynamically stable  Respiratory status: acceptable  Hydration status: euvolemic    PONV: none
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
## 2019-05-12 NOTE — CARE PLAN
Problem: Skin Integrity  Goal: Risk for impaired skin integrity will decrease  Patient just out of surgery.  Patient non weight bearing to bilateral lower extremities.  Will turn patient every two hours.
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
## 2019-05-12 NOTE — ASSESSMENT & PLAN NOTE
Right superior and inferior pubic rami fractures with questionable widening of the right SI joint.  Percutaneous skeletal fixation of right SI joint disruption, left sacral fracture (posterior pelvic fracture) and right anterior pelvic fracture.  Weight bearing status - Nonweightbearing BLE.  Russell Jimenez MD. Orthopedic Surgery.
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
## 2019-05-12 NOTE — ED NOTES
Med Rec Updated and Complete per Pt at bedside with permission to do so in front of family/visitors  Allergies Reviewed  No PO ABX last 30 days.    Pt denies OTC medications at this time.
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
## 2019-05-13 LAB
MAGNESIUM SERPL-MCNC: 1.9 MG/DL (ref 1.5–2.5)
PHOSPHATE SERPL-MCNC: 3 MG/DL (ref 2.5–4.5)

## 2019-05-13 PROCEDURE — A9270 NON-COVERED ITEM OR SERVICE: HCPCS | Performed by: SURGERY

## 2019-05-13 PROCEDURE — A9270 NON-COVERED ITEM OR SERVICE: HCPCS | Performed by: NURSE PRACTITIONER

## 2019-05-13 PROCEDURE — 700102 HCHG RX REV CODE 250 W/ 637 OVERRIDE(OP): Performed by: SURGERY

## 2019-05-13 PROCEDURE — 700112 HCHG RX REV CODE 229: Performed by: SURGERY

## 2019-05-13 PROCEDURE — 83735 ASSAY OF MAGNESIUM: CPT

## 2019-05-13 PROCEDURE — 36415 COLL VENOUS BLD VENIPUNCTURE: CPT

## 2019-05-13 PROCEDURE — 770006 HCHG ROOM/CARE - MED/SURG/GYN SEMI*

## 2019-05-13 PROCEDURE — 84100 ASSAY OF PHOSPHORUS: CPT

## 2019-05-13 PROCEDURE — 700102 HCHG RX REV CODE 250 W/ 637 OVERRIDE(OP): Performed by: NURSE PRACTITIONER

## 2019-05-13 PROCEDURE — 97530 THERAPEUTIC ACTIVITIES: CPT

## 2019-05-13 PROCEDURE — 700111 HCHG RX REV CODE 636 W/ 250 OVERRIDE (IP): Performed by: NURSE PRACTITIONER

## 2019-05-13 RX ADMIN — DOCUSATE SODIUM 100 MG: 100 CAPSULE, LIQUID FILLED ORAL at 17:04

## 2019-05-13 RX ADMIN — ACETAMINOPHEN 650 MG: 325 TABLET, FILM COATED ORAL at 13:22

## 2019-05-13 RX ADMIN — OXYCODONE HYDROCHLORIDE 5 MG: 5 TABLET ORAL at 07:58

## 2019-05-13 RX ADMIN — ENOXAPARIN SODIUM 30 MG: 100 INJECTION SUBCUTANEOUS at 04:50

## 2019-05-13 RX ADMIN — ENOXAPARIN SODIUM 30 MG: 100 INJECTION SUBCUTANEOUS at 17:04

## 2019-05-13 RX ADMIN — IBUPROFEN 400 MG: 400 TABLET, FILM COATED ORAL at 23:05

## 2019-05-13 RX ADMIN — LEVOTHYROXINE SODIUM 175 MCG: 25 TABLET ORAL at 17:04

## 2019-05-13 RX ADMIN — POLYETHYLENE GLYCOL 3350 1 PACKET: 17 POWDER, FOR SOLUTION ORAL at 17:05

## 2019-05-13 RX ADMIN — SENNOSIDES, DOCUSATE SODIUM 1 TABLET: 50; 8.6 TABLET, FILM COATED ORAL at 23:05

## 2019-05-13 ASSESSMENT — ENCOUNTER SYMPTOMS
BACK PAIN: 0
FOCAL WEAKNESS: 0
NECK PAIN: 0
VOMITING: 0
MYALGIAS: 1
DOUBLE VISION: 0
SPEECH CHANGE: 0
DEPRESSION: 0
NAUSEA: 0
ABDOMINAL PAIN: 0
SENSORY CHANGE: 0
SHORTNESS OF BREATH: 0
FEVER: 0
CONSTIPATION: 0
ROS GI COMMENTS: (+) FLATUS
CHILLS: 0

## 2019-05-13 ASSESSMENT — GAIT ASSESSMENTS: GAIT LEVEL OF ASSIST: UNABLE TO PARTICIPATE

## 2019-05-13 NOTE — CARE PLAN
Problem: Communication  Goal: The ability to communicate needs accurately and effectively will improve  Outcome: PROGRESSING AS EXPECTED  Provided patient education about use of call light when needing assistance, patient verbalizes understanding and demonstrates task appropriately. Call light with in reach, hourly rounding in place.     Problem: Respiratory:  Goal: Respiratory status will improve  Outcome: PROGRESSING AS EXPECTED  Assessed patient's ability to perform deep breathing exercises, patient demonstrates task appropriately. Provided education about importance of using incentive spirometry, at a minimum of ten times per hour. Patient verbalizes understanding and demonstrates task appropriately.
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
## 2019-05-13 NOTE — THERAPY
"Physical Therapy Treatment completed.   Bed Mobility:  Supine to Sit: Minimal Assist  Transfers: Sit to Stand: Unable to Participate  Gait: Level Of Assist: Unable to Participate     Plan of Care: Will benefit from Physical Therapy 3 times per week  Discharge Recommendations: Equipment: Will Continue to Assess for Equipment Needs. Post-acute therapy  Recommend inpatient transitional care services for continued physical therapy services.      Pt able to move from supine to long sitting w/ min assist.  Min assist to sit edge of bed. Worked on alternative oob transfer today. \"Amputee style transfer\".  Moving posteriorly from bed onto the w/c w/ min assist.  Continue to follow and address goals set at initial eval.    See \"Rehab Therapy-Acute\" Patient Summary Report for complete documentation.       "

## 2019-05-13 NOTE — DISCHARGE PLANNING
Received Choice form at 1500  Agency/Facility Name: [facility]  Referral sent per Choice form @ [phone]

## 2019-05-13 NOTE — CARE PLAN
Problem: Skin Integrity  Goal: Risk for impaired skin integrity will decrease    Intervention: Assess and monitor skin integrity, appearance and/or temperature  Surgical dressings intact to bilateral hips, no skin breakdown noted.       Problem: Mobility  Goal: Risk for activity intolerance will decrease    Intervention: Encourage patient to increase activity level in collaboration with Interdisciplinary Team  Patient participated with physical therapy today, patient one assist to slide transfer to wheelchair, physical and occupational therapy following.

## 2019-05-13 NOTE — PROGRESS NOTES
Setup OHF-mb to patients bed  If you need any assistance or have any questions please call traction at [phone]
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
## 2019-05-13 NOTE — DISCHARGE PLANNING
Anticipated Discharge Disposition: SNF    Action: LSW met with the Pt at bedside and completed SNF choice form. Pt chose [facility]    Barriers to Discharge: SNF choice, medical clearance    Plan: Follow up on SNF choice
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
## 2019-05-14 LAB
ANION GAP SERPL CALC-SCNC: 8 MMOL/L (ref 0–11.9)
BASOPHILS # BLD AUTO: 0.8 % (ref 0–1.8)
BASOPHILS # BLD: 0.05 K/UL (ref 0–0.12)
BUN SERPL-MCNC: 16 MG/DL (ref 8–22)
CALCIUM SERPL-MCNC: 8.2 MG/DL (ref 8.5–10.5)
CHLORIDE SERPL-SCNC: 105 MMOL/L (ref 96–112)
CO2 SERPL-SCNC: 27 MMOL/L (ref 20–33)
CREAT SERPL-MCNC: 1 MG/DL (ref 0.5–1.4)
EOSINOPHIL # BLD AUTO: 0.17 K/UL (ref 0–0.51)
EOSINOPHIL NFR BLD: 2.8 % (ref 0–6.9)
ERYTHROCYTE [DISTWIDTH] IN BLOOD BY AUTOMATED COUNT: 40.9 FL (ref 35.9–50)
GLUCOSE SERPL-MCNC: 106 MG/DL (ref 65–99)
HCT VFR BLD AUTO: 38.2 % (ref 42–52)
HGB BLD-MCNC: 12.6 G/DL (ref 14–18)
IMM GRANULOCYTES # BLD AUTO: 0.03 K/UL (ref 0–0.11)
IMM GRANULOCYTES NFR BLD AUTO: 0.5 % (ref 0–0.9)
LYMPHOCYTES # BLD AUTO: 1.5 K/UL (ref 1–4.8)
LYMPHOCYTES NFR BLD: 24.5 % (ref 22–41)
MCH RBC QN AUTO: 29 PG (ref 27–33)
MCHC RBC AUTO-ENTMCNC: 33 G/DL (ref 33.7–35.3)
MCV RBC AUTO: 87.8 FL (ref 81.4–97.8)
MONOCYTES # BLD AUTO: 0.98 K/UL (ref 0–0.85)
MONOCYTES NFR BLD AUTO: 16 % (ref 0–13.4)
NEUTROPHILS # BLD AUTO: 3.39 K/UL (ref 1.82–7.42)
NEUTROPHILS NFR BLD: 55.4 % (ref 44–72)
NRBC # BLD AUTO: 0 K/UL
NRBC BLD-RTO: 0 /100 WBC
PLATELET # BLD AUTO: 126 K/UL (ref 164–446)
PMV BLD AUTO: 9 FL (ref 9–12.9)
POTASSIUM SERPL-SCNC: 4 MMOL/L (ref 3.6–5.5)
RBC # BLD AUTO: 4.35 M/UL (ref 4.7–6.1)
SODIUM SERPL-SCNC: 140 MMOL/L (ref 135–145)
WBC # BLD AUTO: 6.1 K/UL (ref 4.8–10.8)

## 2019-05-14 PROCEDURE — 700102 HCHG RX REV CODE 250 W/ 637 OVERRIDE(OP): Performed by: SURGERY

## 2019-05-14 PROCEDURE — 700111 HCHG RX REV CODE 636 W/ 250 OVERRIDE (IP): Performed by: NURSE PRACTITIONER

## 2019-05-14 PROCEDURE — 85025 COMPLETE CBC W/AUTO DIFF WBC: CPT

## 2019-05-14 PROCEDURE — 97530 THERAPEUTIC ACTIVITIES: CPT

## 2019-05-14 PROCEDURE — A9270 NON-COVERED ITEM OR SERVICE: HCPCS | Performed by: SURGERY

## 2019-05-14 PROCEDURE — 700102 HCHG RX REV CODE 250 W/ 637 OVERRIDE(OP): Performed by: NURSE PRACTITIONER

## 2019-05-14 PROCEDURE — A9270 NON-COVERED ITEM OR SERVICE: HCPCS | Performed by: NURSE PRACTITIONER

## 2019-05-14 PROCEDURE — 770006 HCHG ROOM/CARE - MED/SURG/GYN SEMI*

## 2019-05-14 PROCEDURE — 80048 BASIC METABOLIC PNL TOTAL CA: CPT

## 2019-05-14 PROCEDURE — 700112 HCHG RX REV CODE 229: Performed by: SURGERY

## 2019-05-14 PROCEDURE — 97535 SELF CARE MNGMENT TRAINING: CPT

## 2019-05-14 PROCEDURE — 36415 COLL VENOUS BLD VENIPUNCTURE: CPT

## 2019-05-14 RX ADMIN — IBUPROFEN 400 MG: 400 TABLET, FILM COATED ORAL at 06:02

## 2019-05-14 RX ADMIN — SENNOSIDES, DOCUSATE SODIUM 1 TABLET: 50; 8.6 TABLET, FILM COATED ORAL at 20:03

## 2019-05-14 RX ADMIN — ENOXAPARIN SODIUM 30 MG: 100 INJECTION SUBCUTANEOUS at 06:02

## 2019-05-14 RX ADMIN — POLYETHYLENE GLYCOL 3350 1 PACKET: 17 POWDER, FOR SOLUTION ORAL at 17:57

## 2019-05-14 RX ADMIN — DOCUSATE SODIUM 100 MG: 100 CAPSULE, LIQUID FILLED ORAL at 06:02

## 2019-05-14 RX ADMIN — LEVOTHYROXINE SODIUM 175 MCG: 25 TABLET ORAL at 17:57

## 2019-05-14 RX ADMIN — ACETAMINOPHEN 650 MG: 325 TABLET, FILM COATED ORAL at 20:03

## 2019-05-14 RX ADMIN — ENOXAPARIN SODIUM 30 MG: 100 INJECTION SUBCUTANEOUS at 17:57

## 2019-05-14 RX ADMIN — MAGNESIUM HYDROXIDE 30 ML: 400 SUSPENSION ORAL at 06:02

## 2019-05-14 RX ADMIN — DOCUSATE SODIUM 100 MG: 100 CAPSULE, LIQUID FILLED ORAL at 17:57

## 2019-05-14 ASSESSMENT — COGNITIVE AND FUNCTIONAL STATUS - GENERAL
DRESSING REGULAR LOWER BODY CLOTHING: A LITTLE
TOILETING: A LOT
HELP NEEDED FOR BATHING: A LOT
DAILY ACTIVITIY SCORE: 18
DRESSING REGULAR UPPER BODY CLOTHING: A LITTLE
SUGGESTED CMS G CODE MODIFIER DAILY ACTIVITY: CK

## 2019-05-14 NOTE — THERAPY
"Occupational Therapy Treatment completed with focus on ADLs, ADL transfers and patient education.  Functional Status: Pt seen for OT session. Seated EOB with dtr on arrival. Educated on BSC use setup and txf; demo'd with SPV using posterior scoot from bed. Recommended use rather than bedpan. Educated on AE for LB dressing; demo'd with SPV, req only reacher for pants, able to don socks with minimal pain and SPV. SB txf to w/c with min A; req v/cs for safety, SB placement and technique. Educated on UB exercises for strength with txfs. Spent excessive time educating on SNF, home DME,and home safety/set up. Left in w/c with dtr.  Progressing with strength and activity tolerance, but continues to be limited by decreased functional mobility, activity tolerance, strength, balance, and pain which are currently affecting pt's ability to complete ADLs/IADLs at baseline as well as home setup. Currently recommend acute OT, and inpatient transitional care services for continued occupational therapy services.     Plan of Care: Will benefit from Occupational Therapy 3 times per week  Discharge Recommendations:  Equipment Wheelchair, Bedside Commode and Reacher. Post-acute therapy: Recommend inpatient transitional care services for continued occupational therapy services.       See \"Rehab Therapy-Acute\" Patient Summary Report for complete documentation.   "

## 2019-05-14 NOTE — CARE PLAN
Problem: Discharge Barriers/Planning  Goal: Patient's continuum of care needs will be met    Intervention: Assess potential discharge barriers on admission and throughout hospital stay  Patient had questions regarding discharge, plan for skilled nursing facility, social work on board.       Problem: Skin Integrity  Goal: Risk for impaired skin integrity will decrease    Intervention: Assess and monitor skin integrity, appearance and/or temperature  Bilateral surgical dressings intact to hips, no skin breakdown noted.

## 2019-05-15 PROCEDURE — A9270 NON-COVERED ITEM OR SERVICE: HCPCS | Performed by: SURGERY

## 2019-05-15 PROCEDURE — 700112 HCHG RX REV CODE 229: Performed by: SURGERY

## 2019-05-15 PROCEDURE — 700102 HCHG RX REV CODE 250 W/ 637 OVERRIDE(OP): Performed by: SURGERY

## 2019-05-15 PROCEDURE — 97530 THERAPEUTIC ACTIVITIES: CPT

## 2019-05-15 PROCEDURE — A9270 NON-COVERED ITEM OR SERVICE: HCPCS | Performed by: NURSE PRACTITIONER

## 2019-05-15 PROCEDURE — 700111 HCHG RX REV CODE 636 W/ 250 OVERRIDE (IP): Performed by: NURSE PRACTITIONER

## 2019-05-15 PROCEDURE — 700102 HCHG RX REV CODE 250 W/ 637 OVERRIDE(OP): Performed by: NURSE PRACTITIONER

## 2019-05-15 PROCEDURE — 97110 THERAPEUTIC EXERCISES: CPT

## 2019-05-15 PROCEDURE — 770006 HCHG ROOM/CARE - MED/SURG/GYN SEMI*

## 2019-05-15 RX ADMIN — ACETAMINOPHEN 650 MG: 325 TABLET, FILM COATED ORAL at 17:11

## 2019-05-15 RX ADMIN — MAGNESIUM HYDROXIDE 30 ML: 400 SUSPENSION ORAL at 04:59

## 2019-05-15 RX ADMIN — ENOXAPARIN SODIUM 30 MG: 100 INJECTION SUBCUTANEOUS at 17:10

## 2019-05-15 RX ADMIN — BISACODYL 10 MG RECTAL SUPPOSITORY 10 MG: at 07:21

## 2019-05-15 RX ADMIN — ENOXAPARIN SODIUM 30 MG: 100 INJECTION SUBCUTANEOUS at 04:58

## 2019-05-15 RX ADMIN — DOCUSATE SODIUM 100 MG: 100 CAPSULE, LIQUID FILLED ORAL at 04:58

## 2019-05-15 RX ADMIN — POLYETHYLENE GLYCOL 3350 1 PACKET: 17 POWDER, FOR SOLUTION ORAL at 04:59

## 2019-05-15 ASSESSMENT — COGNITIVE AND FUNCTIONAL STATUS - GENERAL
DAILY ACTIVITIY SCORE: 18
TOILETING: A LOT
DRESSING REGULAR LOWER BODY CLOTHING: A LITTLE
HELP NEEDED FOR BATHING: A LOT
DRESSING REGULAR UPPER BODY CLOTHING: A LITTLE
SUGGESTED CMS G CODE MODIFIER DAILY ACTIVITY: CK

## 2019-05-15 NOTE — CARE PLAN
Problem: Safety  Goal: Will remain free from injury  Outcome: PROGRESSING AS EXPECTED  Safety precautions in place. Call light within reach. Bed is low and in locked position. Hourly rounding in place.     Problem: Bowel/Gastric:  Goal: Normal bowel function is maintained or improved  Outcome: MET Date Met: 05/15/19  Gave suppository to the pt this morning. Pt had a BM.     Problem: Discharge Barriers/Planning  Goal: Patient's continuum of care needs will be met  Outcome: PROGRESSING AS EXPECTED  Pending SNF placement. SW assisting.

## 2019-05-15 NOTE — CARE PLAN
Problem: Safety  Goal: Will remain free from injury  Outcome: PROGRESSING AS EXPECTED  Safety precautions in place. Bed in lowest and locked position. Call light and personal belongings within reach    Problem: Infection  Goal: Will remain free from infection  Outcome: PROGRESSING AS EXPECTED    Intervention: Implement standard precautions and perform hand washing before and after patient contact  Standard precautions in place

## 2019-05-15 NOTE — THERAPY
"Occupational Therapy Treatment completed with focus on ADL transfers and patient education.  Functional Status:  Pt seen for OT session. Supine>sit with SPV. Declined ADLs, except getting into w/c to go outside with dtr. Educated on NWB status during txfs and w/c safety/management. Provided with waffle cushion. Completed SB txf to w/c with SPV and mod v/cs for SB technique/set-up; continues to req reinforcement. V/cs for I mobilization with w/c. Encouraged to continue UB exercises for strengthening with txfs. Left in w/c with family in hallway. Progressing with functional mobility, but continues to be limited by decreased functional mobility, activity tolerance, strength, balance, and pain which are currently affecting pt's ability to complete ADLs/IADLs at baseline. Currently recommend acute OT, and Recommend inpatient transitional care services for continued occupational therapy services. Increase freq to 4x/wk as pt is progressing and eager to return home.   Plan of Care: Will benefit from Occupational Therapy 4 times per week  Discharge Recommendations:  Equipment Will Continue to Assess for Equipment Needs. Post-acute therapy: Recommend inpatient transitional care services for continued occupational therapy services.       See \"Rehab Therapy-Acute\" Patient Summary Report for complete documentation.   "
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
## 2019-05-16 LAB
MAGNESIUM SERPL-MCNC: 2 MG/DL (ref 1.5–2.5)
PHOSPHATE SERPL-MCNC: 4.1 MG/DL (ref 2.5–4.5)

## 2019-05-16 PROCEDURE — 84100 ASSAY OF PHOSPHORUS: CPT

## 2019-05-16 PROCEDURE — 770006 HCHG ROOM/CARE - MED/SURG/GYN SEMI*

## 2019-05-16 PROCEDURE — A9270 NON-COVERED ITEM OR SERVICE: HCPCS | Performed by: NURSE PRACTITIONER

## 2019-05-16 PROCEDURE — 83735 ASSAY OF MAGNESIUM: CPT

## 2019-05-16 PROCEDURE — 700102 HCHG RX REV CODE 250 W/ 637 OVERRIDE(OP): Performed by: NURSE PRACTITIONER

## 2019-05-16 PROCEDURE — 97530 THERAPEUTIC ACTIVITIES: CPT

## 2019-05-16 PROCEDURE — 97535 SELF CARE MNGMENT TRAINING: CPT

## 2019-05-16 PROCEDURE — 36415 COLL VENOUS BLD VENIPUNCTURE: CPT

## 2019-05-16 PROCEDURE — 700111 HCHG RX REV CODE 636 W/ 250 OVERRIDE (IP): Performed by: NURSE PRACTITIONER

## 2019-05-16 RX ADMIN — LEVOTHYROXINE SODIUM 175 MCG: 25 TABLET ORAL at 17:16

## 2019-05-16 RX ADMIN — ENOXAPARIN SODIUM 30 MG: 100 INJECTION SUBCUTANEOUS at 06:36

## 2019-05-16 RX ADMIN — ENOXAPARIN SODIUM 30 MG: 100 INJECTION SUBCUTANEOUS at 17:16

## 2019-05-16 ASSESSMENT — COGNITIVE AND FUNCTIONAL STATUS - GENERAL
HELP NEEDED FOR BATHING: A LITTLE
MOVING TO AND FROM BED TO CHAIR: UNABLE
MOBILITY SCORE: 7
STANDING UP FROM CHAIR USING ARMS: A LOT
DRESSING REGULAR UPPER BODY CLOTHING: A LITTLE
DRESSING REGULAR LOWER BODY CLOTHING: A LOT
DAILY ACTIVITIY SCORE: 18
MOVING FROM LYING ON BACK TO SITTING ON SIDE OF FLAT BED: UNABLE
TURNING FROM BACK TO SIDE WHILE IN FLAT BAD: UNABLE
SUGGESTED CMS G CODE MODIFIER DAILY ACTIVITY: CK
TOILETING: A LOT
WALKING IN HOSPITAL ROOM: TOTAL
SUGGESTED CMS G CODE MODIFIER MOBILITY: CM
CLIMB 3 TO 5 STEPS WITH RAILING: TOTAL

## 2019-05-16 ASSESSMENT — GAIT ASSESSMENTS: GAIT LEVEL OF ASSIST: UNABLE TO PARTICIPATE
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
## 2019-05-16 NOTE — THERAPY
"Physical Therapy Treatment completed.   Bed Mobility:  Supine to Sit: Supervised  Transfers: Sit to Stand: Unable to Participate   Suzy for slideboard transfers   Gait: Level Of Assist: Unable to Participate  Plan of Care: Will benefit from Physical Therapy 3 times per week  Discharge Recommendations: Equipment: Will Continue to Assess for Equipment Needs. Post-acute therapy Recommend inpatient transitional care services for continued physical therapy services.        See \"Rehab Therapy-Acute\" Patient Summary Report for complete documentation.     Pt was pleasant and agreeable to therapy session. Pt completed slide board transfer with Suzy to maintain NWB. As well required mod cues for set up of board and wc.He was able to complete wc mobility but fatigued quickly. Continue to recommend post acute transitional care facility prior to dc home. Will continue to follow while in house.   "

## 2019-05-16 NOTE — DISCHARGE PLANNING
Anticipated Discharge Disposition: SNF    Action: LSW spoke with pt and pt's daughter, Romelia at bedside. Romelia and pt requested an update on pt's discharge plan. LSW informed them of [facility]'s decline. Pt signed choice form for a blanket referral of everyone except [facility]. LSW faxed choice form to [facility].     Pt requested a work letter, an airline letter, an [employer] letter and information on getting a handicap sticker.     LSW completed work letter, airline letter and [employer] letter for pt. LSW provided pt with the information on obtaining a handicap sticker.     Barriers to Discharge: None    Plan: Awaiting SNF acceptance.

## 2019-05-16 NOTE — THERAPY
"Occupational Therapy Treatment completed with focus on ADLs, ADL transfers and patient education.  Functional Status: Pt seen for OT session. Supine>sit with SPV and use of trapeze. SB txf to w/c with min A for adhering to NWB BLE; req v/cs for safety with w/c. Educated and engaged in problem solving for setup for shower/txf to bench. SB Txf with min A for BLE. Completed seated shower with min A. UB dressing with SPV, and LB dressing with mod A as pt began to c/o fatigue and req v/cs for WB. SB txf back to w/c with min A. SB txf BTB with min A. Min A for scooting in bed. Progressing with activity tolerance, but continues to be limited by decreased functional mobility, activity tolerance, strength, balance, and pain which are currently affecting pt's ability to complete ADLs/IADLs at baseline. Currently recommend cute OT, and inpatient transitional care services for continued occupational therapy services.     Plan of Care: Will benefit from Occupational Therapy 3 times per week  Discharge Recommendations:  Equipment Will Continue to Assess for Equipment Needs. Post-acute therapy: Recommend inpatient transitional care services for continued occupational therapy services.     See \"Rehab Therapy-Acute\" Patient Summary Report for complete documentation.   "

## 2019-05-16 NOTE — DISCHARGE PLANNING
Anticipated Discharge Disposition: SNF    Action: LSW attempted to speak with pt at bedside. Pt was asleep.     Barriers to Discharge: None    Plan: Attempt to meet with pt and obtain blanket SNF referral.

## 2019-05-16 NOTE — DISCHARGE PLANNING
Received Choice form at [phone]  Agency/Facility Name: [facility], [facility], [facility], [facility], [facility], [facility]  Referral sent per Choice form @ [phone]

## 2019-05-17 PROCEDURE — 700102 HCHG RX REV CODE 250 W/ 637 OVERRIDE(OP): Performed by: ORTHOPAEDIC SURGERY

## 2019-05-17 PROCEDURE — A9270 NON-COVERED ITEM OR SERVICE: HCPCS | Performed by: NURSE PRACTITIONER

## 2019-05-17 PROCEDURE — 700102 HCHG RX REV CODE 250 W/ 637 OVERRIDE(OP): Performed by: NURSE PRACTITIONER

## 2019-05-17 PROCEDURE — 97535 SELF CARE MNGMENT TRAINING: CPT

## 2019-05-17 PROCEDURE — A9270 NON-COVERED ITEM OR SERVICE: HCPCS | Performed by: ORTHOPAEDIC SURGERY

## 2019-05-17 PROCEDURE — 700111 HCHG RX REV CODE 636 W/ 250 OVERRIDE (IP): Performed by: NURSE PRACTITIONER

## 2019-05-17 PROCEDURE — 770006 HCHG ROOM/CARE - MED/SURG/GYN SEMI*

## 2019-05-17 RX ORDER — CHLORPROMAZINE HYDROCHLORIDE 10 MG/1
25 TABLET, FILM COATED ORAL 3 TIMES DAILY PRN
Status: DISCONTINUED | OUTPATIENT
Start: 2019-05-17 | End: 2019-05-22 | Stop reason: HOSPADM

## 2019-05-17 RX ADMIN — ENOXAPARIN SODIUM 30 MG: 100 INJECTION SUBCUTANEOUS at 05:24

## 2019-05-17 RX ADMIN — ENOXAPARIN SODIUM 30 MG: 100 INJECTION SUBCUTANEOUS at 17:48

## 2019-05-17 RX ADMIN — LEVOTHYROXINE SODIUM 175 MCG: 25 TABLET ORAL at 17:48

## 2019-05-17 RX ADMIN — CHLORPROMAZINE HYDROCHLORIDE 25 MG: 10 TABLET, SUGAR COATED ORAL at 22:53

## 2019-05-17 RX ADMIN — ACETAMINOPHEN 650 MG: 325 TABLET, FILM COATED ORAL at 00:59

## 2019-05-17 ASSESSMENT — COGNITIVE AND FUNCTIONAL STATUS - GENERAL
HELP NEEDED FOR BATHING: A LOT
SUGGESTED CMS G CODE MODIFIER DAILY ACTIVITY: CK
DRESSING REGULAR UPPER BODY CLOTHING: A LOT
DRESSING REGULAR LOWER BODY CLOTHING: A LOT
DAILY ACTIVITIY SCORE: 16
TOILETING: A LOT
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
## 2019-05-17 NOTE — CARE PLAN
Problem: Communication  Goal: The ability to communicate needs accurately and effectively will improve  Outcome: PROGRESSING AS EXPECTED  Provided patient education about use of call light when needing assistance or wanting to get out of bed, patient verbalizes understanding and demonstrates task appropriately. Call light with in reach, hourly rounding in place.     Problem: Safety  Goal: Will remain free from injury  Outcome: PROGRESSING AS EXPECTED  Provided patient education about fall risk and importance of calling for assistance when wanting to get out of bed. Patient verbalizes understanding. Fall precautions in place: bed locked and in lowest position, patient wearing threaded footwear, call light within reach.
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
## 2019-05-18 PROCEDURE — A9270 NON-COVERED ITEM OR SERVICE: HCPCS | Performed by: NURSE PRACTITIONER

## 2019-05-18 PROCEDURE — 700102 HCHG RX REV CODE 250 W/ 637 OVERRIDE(OP): Performed by: NURSE PRACTITIONER

## 2019-05-18 PROCEDURE — 770006 HCHG ROOM/CARE - MED/SURG/GYN SEMI*

## 2019-05-18 PROCEDURE — 700111 HCHG RX REV CODE 636 W/ 250 OVERRIDE (IP): Performed by: NURSE PRACTITIONER

## 2019-05-18 RX ADMIN — ENOXAPARIN SODIUM 30 MG: 100 INJECTION SUBCUTANEOUS at 06:11

## 2019-05-18 RX ADMIN — LEVOTHYROXINE SODIUM 175 MCG: 25 TABLET ORAL at 17:05

## 2019-05-18 RX ADMIN — ENOXAPARIN SODIUM 30 MG: 100 INJECTION SUBCUTANEOUS at 17:05

## 2019-05-18 NOTE — CARE PLAN
Problem: Communication  Goal: The ability to communicate needs accurately and effectively will improve  Outcome: PROGRESSING AS EXPECTED  Provided patient education about use of call light when needing assistance or wanting to get out of bed, patient verbalizes understanding and demonstrates task appropriately. Call light with in reach, hourly rounding in place.     Problem: Pain Management  Goal: Pain level will decrease to patient's comfort goal  Outcome: PROGRESSING AS EXPECTED  Patient aware of pharmacological interventions available, verbalizes understanding. Education about non-pharmacological interventions provided, patient verbalizes understanding. Patient states to currently be a tolerable level of pain does not require frequent pharmacological intervention at this moment.

## 2019-05-19 PROCEDURE — 700102 HCHG RX REV CODE 250 W/ 637 OVERRIDE(OP): Performed by: SURGERY

## 2019-05-19 PROCEDURE — 770006 HCHG ROOM/CARE - MED/SURG/GYN SEMI*

## 2019-05-19 PROCEDURE — 700112 HCHG RX REV CODE 229: Performed by: SURGERY

## 2019-05-19 PROCEDURE — A9270 NON-COVERED ITEM OR SERVICE: HCPCS | Performed by: NURSE PRACTITIONER

## 2019-05-19 PROCEDURE — A9270 NON-COVERED ITEM OR SERVICE: HCPCS | Performed by: SURGERY

## 2019-05-19 PROCEDURE — 700102 HCHG RX REV CODE 250 W/ 637 OVERRIDE(OP): Performed by: NURSE PRACTITIONER

## 2019-05-19 PROCEDURE — 700111 HCHG RX REV CODE 636 W/ 250 OVERRIDE (IP): Performed by: NURSE PRACTITIONER

## 2019-05-19 RX ADMIN — ENOXAPARIN SODIUM 30 MG: 100 INJECTION SUBCUTANEOUS at 05:47

## 2019-05-19 RX ADMIN — DOCUSATE SODIUM 100 MG: 100 CAPSULE, LIQUID FILLED ORAL at 17:52

## 2019-05-19 RX ADMIN — POLYETHYLENE GLYCOL 3350 1 PACKET: 17 POWDER, FOR SOLUTION ORAL at 17:52

## 2019-05-19 RX ADMIN — LEVOTHYROXINE SODIUM 175 MCG: 25 TABLET ORAL at 17:51

## 2019-05-19 RX ADMIN — ENOXAPARIN SODIUM 30 MG: 100 INJECTION SUBCUTANEOUS at 17:52
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
## 2019-05-19 NOTE — CARE PLAN
Problem: Infection  Goal: Will remain free from infection  Outcome: PROGRESSING AS EXPECTED  Hand hygiene performed before and after patient contact. Standard precautions in place.    Problem: Pain Management  Goal: Pain level will decrease to patient's comfort goal  Outcome: PROGRESSING AS EXPECTED  Available pain management interventions discussed with patient. Patient encouraged to notify staff of any changes in pain. Patient verbalized understanding.

## 2019-05-20 PROCEDURE — 770006 HCHG ROOM/CARE - MED/SURG/GYN SEMI*

## 2019-05-20 PROCEDURE — A9270 NON-COVERED ITEM OR SERVICE: HCPCS | Performed by: SURGERY

## 2019-05-20 PROCEDURE — 700111 HCHG RX REV CODE 636 W/ 250 OVERRIDE (IP): Performed by: NURSE PRACTITIONER

## 2019-05-20 PROCEDURE — 700112 HCHG RX REV CODE 229: Performed by: SURGERY

## 2019-05-20 RX ADMIN — ENOXAPARIN SODIUM 30 MG: 100 INJECTION SUBCUTANEOUS at 09:15

## 2019-05-20 RX ADMIN — ENOXAPARIN SODIUM 30 MG: 100 INJECTION SUBCUTANEOUS at 21:52

## 2019-05-20 RX ADMIN — DOCUSATE SODIUM 100 MG: 100 CAPSULE, LIQUID FILLED ORAL at 09:15

## 2019-05-20 NOTE — DISCHARGE PLANNING
"Anticipated Discharge Disposition: SNF    Action: LSW received this response from Penxy and updated the CCA. \"per Medicare response history, [employer] is primary.  They are [facility] with [facility] and would be primary to [facility] as well.     Barriers to Discharge: SNF acceptance    Plan: follow up with SNF referrals    "

## 2019-05-20 NOTE — CARE PLAN
Problem: Safety  Goal: Will remain free from falls    Intervention: Implement fall precautions  Calls appropriately. Call light and personal belongings within easy reach. Educated on level of risk. Fall precautions in place. Instructed to call for assistance whenever needed.      Problem: Pain Management  Goal: Pain level will decrease to patient's comfort goal    Intervention: Educate and implement non-pharmacologic comfort measures. Examples: relaxation, distration, play therapy, activity therapy, massage, etc.  No complaints of pain at the moment. Clustered nursing interventions to promote rest. Hourly rounding in place.
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
## 2019-05-20 NOTE — DISCHARGE PLANNING
Agency/Facility Name: [facility]  Spoke To: Addie  Outcome: Referral pending until Addie can get more info regarding insurance. Updated referral sent per Addie.

## 2019-05-20 NOTE — CARE PLAN
Problem: Pain Management  Goal: Pain level will decrease to patient's comfort goal  Outcome: PROGRESSING AS EXPECTED  Comfortable without additonal prn medication        Problem: Respiratory:  Goal: Respiratory status will improve  Outcome: PROGRESSING AS EXPECTED  Comfortable on room air.
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
## 2019-05-20 NOTE — DISCHARGE PLANNING
Agency/Facility Name: [facility]  Outcome: Per LSW Ines, patient's dtr was told by [facility] that they could accept [employer] insurance. This [facility] was told on 5/17 that they could not. Called and left voicemail to follow up.
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
## 2019-05-21 PROCEDURE — 700102 HCHG RX REV CODE 250 W/ 637 OVERRIDE(OP): Performed by: NURSE PRACTITIONER

## 2019-05-21 PROCEDURE — 700111 HCHG RX REV CODE 636 W/ 250 OVERRIDE (IP): Performed by: NURSE PRACTITIONER

## 2019-05-21 PROCEDURE — A9270 NON-COVERED ITEM OR SERVICE: HCPCS | Performed by: NURSE PRACTITIONER

## 2019-05-21 PROCEDURE — 770006 HCHG ROOM/CARE - MED/SURG/GYN SEMI*

## 2019-05-21 RX ADMIN — ENOXAPARIN SODIUM 30 MG: 100 INJECTION SUBCUTANEOUS at 16:59

## 2019-05-21 RX ADMIN — LEVOTHYROXINE SODIUM 175 MCG: 25 TABLET ORAL at 16:59

## 2019-05-21 RX ADMIN — ENOXAPARIN SODIUM 30 MG: 100 INJECTION SUBCUTANEOUS at 05:56

## 2019-05-21 NOTE — DISCHARGE PLANNING
Anticipated Discharge Disposition: SNF    Action: LSW contacted Ortho KIANA Lee to inform him that SNF's are denying the Pt and asked if the Pt would be appropriate for acute rehab. KIANA Lee said that the Pt is non weight barring but would put in an order to see if someone would accept the Pt. LSW updated Pt dtr Romelia of the SNF denials and stated that they are waiting to two other SNF's to get back to them and if they dent the SNF search will need to be expanded to [place].     Barriers to Discharge: SNF acceptance    Plan: follow up on SNF referrals
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
## 2019-05-21 NOTE — CARE PLAN
Problem: Venous Thromboembolism (VTW)/Deep Vein Thrombosis (DVT) Prevention:  Goal: Patient will participate in Venous Thrombosis (VTE)/Deep Vein Thrombosis (DVT)Prevention Measures  Outcome: PROGRESSING AS EXPECTED  SCD applied, administered lovenox per MAR.  Pt ambulated.
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
## 2019-05-21 NOTE — PROGRESS NOTES
CMS intact.  Pt spent approximately one hour in wheelchair and mobilized around unit.  Pt displayed no difficulties when transferring from wheelchair to bed.  Pt denies pain.

## 2019-05-21 NOTE — CARE PLAN
Problem: Infection  Goal: Will remain free from infection  Outcome: PROGRESSING AS EXPECTED  Afebrile, dressing change done.     Problem: Pain Management  Goal: Pain level will decrease to patient's comfort goal  Outcome: PROGRESSING AS EXPECTED  Has no need for prn pain medication.
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
## 2019-05-22 VITALS
WEIGHT: 161.38 LBS | HEIGHT: 73 IN | BODY MASS INDEX: 21.39 KG/M2 | HEART RATE: 66 BPM | DIASTOLIC BLOOD PRESSURE: 69 MMHG | OXYGEN SATURATION: 92 % | TEMPERATURE: 98.6 F | SYSTOLIC BLOOD PRESSURE: 108 MMHG | RESPIRATION RATE: 17 BRPM

## 2019-05-22 PROCEDURE — A9270 NON-COVERED ITEM OR SERVICE: HCPCS | Performed by: SURGERY

## 2019-05-22 PROCEDURE — 97530 THERAPEUTIC ACTIVITIES: CPT

## 2019-05-22 PROCEDURE — 700111 HCHG RX REV CODE 636 W/ 250 OVERRIDE (IP): Performed by: NURSE PRACTITIONER

## 2019-05-22 PROCEDURE — 700112 HCHG RX REV CODE 229: Performed by: SURGERY

## 2019-05-22 RX ORDER — LEVOTHYROXINE SODIUM 175 UG/1
175 TABLET ORAL EVERY EVENING
Qty: 30 TAB | Status: ON HOLD
Start: 2019-05-22 | End: 2023-11-29

## 2019-05-22 RX ORDER — POLYETHYLENE GLYCOL 3350 17 G/17G
17 POWDER, FOR SOLUTION ORAL DAILY
Start: 2019-05-22 | End: 2023-11-21

## 2019-05-22 RX ORDER — BISACODYL 10 MG
10 SUPPOSITORY, RECTAL RECTAL
Refills: 0
Start: 2019-05-22 | End: 2023-11-21

## 2019-05-22 RX ORDER — CHLORPROMAZINE HYDROCHLORIDE 25 MG/1
25 TABLET, FILM COATED ORAL 3 TIMES DAILY PRN
Qty: 90 TAB
Start: 2019-05-22 | End: 2023-11-21

## 2019-05-22 RX ORDER — PSEUDOEPHEDRINE HCL 30 MG
100 TABLET ORAL 2 TIMES DAILY
Qty: 60 CAP
Start: 2019-05-22 | End: 2023-11-21

## 2019-05-22 RX ORDER — AMOXICILLIN 250 MG
1 CAPSULE ORAL DAILY
Qty: 30 TAB | Refills: 0
Start: 2019-05-22 | End: 2023-11-21

## 2019-05-22 RX ORDER — ACETAMINOPHEN 325 MG/1
650 TABLET ORAL EVERY 4 HOURS PRN
Qty: 30 TAB | Refills: 0 | Status: ON HOLD
Start: 2019-05-22 | End: 2023-11-29

## 2019-05-22 RX ORDER — OXYCODONE HYDROCHLORIDE 5 MG/1
2.5-5 TABLET ORAL
Qty: 30 TAB | Refills: 0 | Status: SHIPPED | OUTPATIENT
Start: 2019-05-22 | End: 2019-06-06

## 2019-05-22 RX ORDER — ONDANSETRON 2 MG/ML
4 INJECTION INTRAMUSCULAR; INTRAVENOUS EVERY 4 HOURS PRN
Qty: 84 ML
Start: 2019-05-22 | End: 2023-11-21

## 2019-05-22 RX ORDER — IBUPROFEN 400 MG/1
400 TABLET ORAL EVERY 6 HOURS PRN
Qty: 30 TAB | Status: ON HOLD
Start: 2019-05-22 | End: 2023-11-29

## 2019-05-22 RX ORDER — AMOXICILLIN 250 MG
1 CAPSULE ORAL
Qty: 30 TAB | Refills: 0
Start: 2019-05-22 | End: 2023-11-21

## 2019-05-22 RX ORDER — ENEMA 19; 7 G/133ML; G/133ML
1 ENEMA RECTAL
Start: 2019-05-22 | End: 2023-11-21

## 2019-05-22 RX ADMIN — DOCUSATE SODIUM 100 MG: 100 CAPSULE, LIQUID FILLED ORAL at 11:10

## 2019-05-22 RX ADMIN — ENOXAPARIN SODIUM 30 MG: 100 INJECTION SUBCUTANEOUS at 11:10

## 2019-05-22 ASSESSMENT — COGNITIVE AND FUNCTIONAL STATUS - GENERAL
MOVING FROM LYING ON BACK TO SITTING ON SIDE OF FLAT BED: UNABLE
MOBILITY SCORE: 8
TURNING FROM BACK TO SIDE WHILE IN FLAT BAD: UNABLE
STANDING UP FROM CHAIR USING ARMS: A LITTLE
CLIMB 3 TO 5 STEPS WITH RAILING: TOTAL
MOVING TO AND FROM BED TO CHAIR: UNABLE
SUGGESTED CMS G CODE MODIFIER MOBILITY: CM
WALKING IN HOSPITAL ROOM: TOTAL

## 2019-05-22 ASSESSMENT — GAIT ASSESSMENTS: GAIT LEVEL OF ASSIST: UNABLE TO PARTICIPATE

## 2019-05-22 NOTE — PROGRESS NOTES
Reviewed discharge instructions and prescriptions with patient, patient verbalized understanding. Escorted by [employer] via wheelchair.

## 2019-05-22 NOTE — DISCHARGE INSTRUCTIONS
Discharge Instructions    Discharged to other by medical transportation with escort. Discharged via wheelchair, hospital escort: Yes.  Special equipment needed: Wheelchair    Be sure to schedule a follow-up appointment with your primary care doctor or any specialists as instructed.     Discharge Plan:   Pneumococcal Vaccine Administered/Refused: Given (See MAR)  Influenza Vaccine Indication: Patient Refuses    I understand that a diet low in cholesterol, fat, and sodium is recommended for good health. Unless I have been given specific instructions below for another diet, I accept this instruction as my diet prescription.   Other diet: Regular    Special Instructions: Discharge instructions for the Orthopedic Patient    Follow up with Primary Care Physician within 2 weeks of discharge to home, regarding:  Review of medications and diagnostic testing.  Surveillance for medical complications.  Workup and treatment of osteoporosis, if appropriate.     -Is this a Joint Replacement patient? No    -Is this patient being discharged with medication to prevent blood clots?  Yes, Lovenox Enoxaparin injection  What is this medicine?  ENOXAPARIN (ee nox a PA rin) is used after knee, hip, or abdominal surgeries to prevent blood clotting. It is also used to treat existing blood clots in the lungs or in the veins.  This medicine may be used for other purposes; ask your health care provider or pharmacist if you have questions.  COMMON BRAND NAME(S): Lovenox  What should I tell my health care provider before I take this medicine?  They need to know if you have any of these conditions:  -bleeding disorders, hemorrhage, or hemophilia  -infection of the heart or heart valves  -kidney or liver disease  -previous stroke  -prosthetic heart valve  -recent surgery or delivery of a baby  -ulcer in the stomach or intestine, diverticulitis, or other bowel disease  -an unusual or allergic reaction to enoxaparin, heparin, pork or pork products,  other medicines, foods, dyes, or preservatives  -pregnant or trying to get pregnant  -breast-feeding  How should I use this medicine?  This medicine is for injection under the skin. It is usually given by a health-care professional. You or a family member may be trained on how to give the injections. If you are to give yourself injections, make sure you understand how to use the syringe, measure the dose if necessary, and give the injection. To avoid bruising, do not rub the site where this medicine has been injected. Do not take your medicine more often than directed. Do not stop taking except on the advice of your doctor or health care professional.  Make sure you receive a puncture-resistant container to dispose of the needles and syringes once you have finished with them. Do not reuse these items. Return the container to your doctor or health care professional for proper disposal.  Talk to your pediatrician regarding the use of this medicine in children. Special care may be needed.  Overdosage: If you think you have taken too much of this medicine contact a poison control center or emergency room at once.  NOTE: This medicine is only for you. Do not share this medicine with others.  What if I miss a dose?  If you miss a dose, take it as soon as you can. If it is almost time for your next dose, take only that dose. Do not take double or extra doses.  What may interact with this medicine?  -aspirin and aspirin-like medicines  -certain medicines that treat or prevent blood clots  -dipyridamole  -NSAIDs, medicines for pain and inflammation, like ibuprofen or naproxen  This list may not describe all possible interactions. Give your health care provider a list of all the medicines, herbs, non-prescription drugs, or dietary supplements you use. Also tell them if you smoke, drink alcohol, or use illegal drugs. Some items may interact with your medicine.  What should I watch for while using this medicine?  Visit your  doctor or health care professional for regular checks on your progress. Your condition will be monitored carefully while you are receiving this medicine.  Notify your doctor or health care professional and seek emergency treatment if you develop breathing problems; changes in vision; chest pain; severe, sudden headache; pain, swelling, warmth in the leg; trouble speaking; sudden numbness or weakness of the face, arm, or leg. These can be signs that your condition has gotten worse.  If you are going to have surgery, tell your doctor or health care professional that you are taking this medicine.  Do not stop taking this medicine without first talking to your doctor. Be sure to refill your prescription before you run out of medicine.  Avoid sports and activities that might cause injury while you are using this medicine. Severe falls or injuries can cause unseen bleeding. Be careful when using sharp tools or knives. Consider using an electric razor. Take special care brushing or flossing your teeth. Report any injuries, bruising, or red spots on the skin to your doctor or health care professional.  What side effects may I notice from receiving this medicine?  Side effects that you should report to your doctor or health care professional as soon as possible:  -allergic reactions like skin rash, itching or hives, swelling of the face, lips, or tongue  -feeling faint or lightheaded, falls  -signs and symptoms of bleeding such as bloody or black, tarry stools; red or dark-brown urine; spitting up blood or brown material that looks like coffee grounds; red spots on the skin; unusual bruising or bleeding from the eye, gums, or nose  Side effects that usually do not require medical attention (report to your doctor or health care professional if they continue or are bothersome):  -pain, redness, or irritation at site where injected  This list may not describe all possible side effects. Call your doctor for medical advice about  side effects. You may report side effects to FDA at 4-835-KKW-5950.  Where should I keep my medicine?  Keep out of the reach of children.  Store at room temperature between 15 and 30 degrees C (59 and 86 degrees F). Do not freeze. If your injections have been specially prepared, you may need to store them in the refrigerator. Ask your pharmacist. Throw away any unused medicine after the expiration date.  NOTE: This sheet is a summary. It may not cover all possible information. If you have questions about this medicine, talk to your doctor, pharmacist, or health care provider.  © 2018 Elsevier/Gold Standard (2015-04-21 16:06:21)      · Is patient discharged on Warfarin / Coumadin?   No         Depression / Suicide Risk    As you are discharged from this [facility] facility, it is important to learn how to keep safe from harming yourself.    Recognize the warning signs:  · Abrupt changes in personality, positive or negative- including increase in energy   · Giving away possessions  · Change in eating patterns- significant weight changes-  positive or negative  · Change in sleeping patterns- unable to sleep or sleeping all the time   · Unwillingness or inability to communicate  · Depression  · Unusual sadness, discouragement and loneliness  · Talk of wanting to die  · Neglect of personal appearance   · Rebelliousness- reckless behavior  · Withdrawal from people/activities they love  · Confusion- inability to concentrate     If you or a loved one observes any of these behaviors or has concerns about self-harm, here's what you can do:  · Talk about it- your feelings and reasons for harming yourself  · Remove any means that you might use to hurt yourself (examples: pills, rope, extension cords, firearm)  · Get professional help from the community (Mental Health, Substance Abuse, psychological counseling)  · Do not be alone:Call your Safe Contact- someone whom you trust who will be there for you.  · Call your local  CRISIS HOTLINE 770-7343 or 413-094-0583  · Call your local Children's Mobile Crisis Response Team [place] (101) 147-5612 or www.Buyosphere  · Call the toll free National Suicide Prevention Hotlines   · National Suicide Prevention Lifeline 099-056-IEPL (5752)  · National [employer] Line Network 800-SUICIDE (411-4772)

## 2019-05-22 NOTE — DISCHARGE PLANNING
Received Transport Form @ 1140  Spoke to Alfonso @ [facility]    Transport is scheduled for 5/22 @ 153 going to [facility] [place].    LSW Ines notified.

## 2019-05-22 NOTE — CARE PLAN
Problem: Safety  Goal: Will remain free from falls  Outcome: PROGRESSING AS EXPECTED  Educated on fall risk and ensured fall precautions in place. Bed in lowest position, treaded socks in place, call light in reach, room free of clutter, and proper assistance for patient to transfer to wheelchair     Problem: Infection  Goal: Will remain free from infection  Outcome: PROGRESSING AS EXPECTED  Implemented hand hygiene and proper isolation precautions before and after interacting with patient to prevent spread of germs

## 2019-05-22 NOTE — DISCHARGE PLANNING
Agency/Facility Name: [facility]  Spoke To: Alfonso  Outcome: Sent a new referral per Alfonso's request for review.

## 2019-05-22 NOTE — THERAPY
"Physical Therapy Treatment completed.   Bed Mobility:  Supine to Sit: Supervised  Transfers: Sit to Stand: Unable to Participate   Slideboard: Suzy  Gait: Level Of Assist: Unable to Participate   Plan of Care: Will benefit from Physical Therapy 3 times per week  Discharge Recommendations: Equipment: Will Continue to Assess for Equipment Needs. Post-acute therapy Recommend inpatient transitional care services for continued physical therapy services.        See \"Rehab Therapy-Acute\" Patient Summary Report for complete documentation.     Pt was pleasant presents with flat affect and agreeable to therapy session. Edcuated pt on trialing bed mobility without bed features to represent what it will be at home, pt procceded to use trapeze and raised hob despite encouragement. Pt required mod verbal cues and assist for set up of slide board. He perofrmed slide board with Suzy mainly for adjusting board as well verbal cues to maintain proper NWB on BLE. Pt continues to mainly reuqire vc for set up of wheelchair and slideboard impacting his safety with transfers. Therapist observed pt propelling wheelchair around unit independently at this time. Continue to recommend post acute rehab as pt lives alone and does not currently have set up for wc accessibility. Will continue to follow while in house to progress mobility.   "

## 2019-05-22 NOTE — DISCHARGE PLANNING
Anticipated Discharge Disposition: SNF    Action: LSW contacted Krystal at [facility] who also assistas with [employer] Pt. LSW informed her that the Pt was being denied by SNF's with his [employer] insurance. Krystal stated she will look into it as the SNF's should be contracted with [employer].     Barriers to Discharge: SNF acceptance    Plan: Follow up with Krystal ext [phone]

## 2019-05-22 NOTE — PROGRESS NOTES
Received verbal order to change dressings to bilateral hips with gauze and tegaderm starting today then every 48 hours by nursing per [employer] 5/22/2019.

## 2019-05-22 NOTE — THERAPY
Occupational Therapy Contact Note  Attempted to see pt for OT session. Declined d/t d/c this PM. Pt questioned type of txf req for transport. Checked with Rose Marie CANELA, reports transport likely will be unable to accommodate NWB BLE as req SB txf. Reports she will verify type of txf required. Will continue to follow in case of change in d/c plan.  Mireille SEAY, OTR/L    Pager #[phone]

## 2019-05-22 NOTE — CARE PLAN
Problem: Discharge Barriers/Planning  Goal: Patient's continuum of care needs will be met    Intervention: Assess potential discharge barriers on admission and throughout hospital stay  Plan for SNF per PT and OT recommendations, social work on bed, pending acceptance.       Problem: Mobility  Goal: Risk for activity intolerance will decrease    Intervention: Assess and monitor signs of activity intolerance  Patient slide board transfer to wheelchair, patient tolerates well, PT and OT following.

## 2019-05-22 NOTE — DISCHARGE PLANNING
Anticipated Discharge Disposition: [facility]    Action: LSW informed by Ross at [facility] stating that they have accepted the Pt and received auth for the Pt and can accept him at 3:30. LSW informed the Pt and his dtr Romelia who is at bedside.  LSW informed Ortho [employer] notified.    Barriers to Discharge: None    Plan: DC to [facility]
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
## 2021-01-15 DIAGNOSIS — Z23 NEED FOR VACCINATION: ICD-10-CM

## 2023-03-21 NOTE — DISCHARGE PLANNING
Agency/Facility Name: [facility]  Outcome: New referral sent per Ines (LSW).      Heavy period

## 2023-11-17 PROBLEM — M79.671 RIGHT FOOT PAIN: Status: ACTIVE | Noted: 2023-11-17

## 2023-11-21 ENCOUNTER — APPOINTMENT (OUTPATIENT)
Dept: ADMISSIONS | Facility: MEDICAL CENTER | Age: 73
End: 2023-11-21
Attending: ORTHOPAEDIC SURGERY
Payer: COMMERCIAL

## 2023-11-28 ENCOUNTER — APPOINTMENT (OUTPATIENT)
Dept: ADMISSIONS | Facility: MEDICAL CENTER | Age: 73
End: 2023-11-28
Attending: ORTHOPAEDIC SURGERY
Payer: COMMERCIAL

## 2023-11-28 DIAGNOSIS — Z01.810 PRE-OPERATIVE CARDIOVASCULAR EXAMINATION: ICD-10-CM

## 2023-11-28 LAB — EKG IMPRESSION: NORMAL

## 2023-11-28 PROCEDURE — 93010 ELECTROCARDIOGRAM REPORT: CPT | Performed by: INTERNAL MEDICINE

## 2023-11-28 PROCEDURE — 93005 ELECTROCARDIOGRAM TRACING: CPT

## 2023-11-28 NOTE — DISCHARGE PLANNING
"DISCHARGE PLANNING NOTE -     Procedure: Procedure(s):  RIGHT LISFRANC OPEN REDUCTION INTERNAL FIXATION, 1/2 TARSOMETATARSAL FUSION, CALCANEAL AUTOGRAFT  BONE GRAFT  Procedure Date: 11/29/2023  Insurance: Payor: [employer] / Plan: [employer]/[facility] / Product Type: *No Product type* /    Equipment currently available at home?  crutches and knee scooter   Steps into the home? 2  Steps within the home? 0  Toilet height? Standard 6'0\"  Type of shower? walk-in shower, hand held no grab bars   Who will be with you during your recovery? other: Leandra Marie       Plan: Anticipate home. Met with patient and Leandra Marie during preadmission appointment.  Pt states he has been NWB in the past before. He already has a knee scooter and crutches and boot at home and has been practicing with them. He states he will be NWB for 6 weeks. Medical equipment list and home safety checklist given and reviewed with pt and friend Leandra. Recommended shower chair and toilet seat riser and stationary chairs with arms to get in and out of easier. Pt has no concerns at this time.   "

## 2023-11-29 ENCOUNTER — HOSPITAL ENCOUNTER (OUTPATIENT)
Facility: MEDICAL CENTER | Age: 73
End: 2023-11-29
Attending: ORTHOPAEDIC SURGERY | Admitting: ORTHOPAEDIC SURGERY
Payer: COMMERCIAL

## 2023-11-29 ENCOUNTER — APPOINTMENT (OUTPATIENT)
Dept: RADIOLOGY | Facility: MEDICAL CENTER | Age: 73
End: 2023-11-29
Attending: ORTHOPAEDIC SURGERY
Payer: COMMERCIAL

## 2023-11-29 ENCOUNTER — ANESTHESIA (OUTPATIENT)
Dept: SURGERY | Facility: MEDICAL CENTER | Age: 73
End: 2023-11-29
Payer: COMMERCIAL

## 2023-11-29 ENCOUNTER — ANESTHESIA EVENT (OUTPATIENT)
Dept: SURGERY | Facility: MEDICAL CENTER | Age: 73
End: 2023-11-29
Payer: COMMERCIAL

## 2023-11-29 VITALS
HEIGHT: 73 IN | OXYGEN SATURATION: 95 % | DIASTOLIC BLOOD PRESSURE: 81 MMHG | HEART RATE: 61 BPM | WEIGHT: 163.8 LBS | TEMPERATURE: 97.8 F | SYSTOLIC BLOOD PRESSURE: 164 MMHG | RESPIRATION RATE: 16 BRPM | BODY MASS INDEX: 21.71 KG/M2

## 2023-11-29 DIAGNOSIS — S93.324A LISFRANC DISLOCATION, RIGHT, INITIAL ENCOUNTER: ICD-10-CM

## 2023-11-29 PROCEDURE — 160028 HCHG SURGERY MINUTES - 1ST 30 MINS LEVEL 3: Performed by: ORTHOPAEDIC SURGERY

## 2023-11-29 PROCEDURE — 160039 HCHG SURGERY MINUTES - EA ADDL 1 MIN LEVEL 3: Performed by: ORTHOPAEDIC SURGERY

## 2023-11-29 PROCEDURE — 20902 REMOVAL OF BONE FOR GRAFT: CPT | Mod: ASROC,RT | Performed by: SPECIALIST/TECHNOLOGIST, OTHER

## 2023-11-29 PROCEDURE — 64445 NJX AA&/STRD SCIATIC NRV IMG: CPT | Performed by: ORTHOPAEDIC SURGERY

## 2023-11-29 PROCEDURE — 700111 HCHG RX REV CODE 636 W/ 250 OVERRIDE (IP): Mod: JZ | Performed by: ORTHOPAEDIC SURGERY

## 2023-11-29 PROCEDURE — A9270 NON-COVERED ITEM OR SERVICE: HCPCS | Performed by: STUDENT IN AN ORGANIZED HEALTH CARE EDUCATION/TRAINING PROGRAM

## 2023-11-29 PROCEDURE — 700102 HCHG RX REV CODE 250 W/ 637 OVERRIDE(OP): Performed by: STUDENT IN AN ORGANIZED HEALTH CARE EDUCATION/TRAINING PROGRAM

## 2023-11-29 PROCEDURE — 160035 HCHG PACU - 1ST 60 MINS PHASE I: Performed by: ORTHOPAEDIC SURGERY

## 2023-11-29 PROCEDURE — 160009 HCHG ANES TIME/MIN: Performed by: ORTHOPAEDIC SURGERY

## 2023-11-29 PROCEDURE — 28730 FUSION OF FOOT BONES: CPT | Mod: ASROC,RT | Performed by: SPECIALIST/TECHNOLOGIST, OTHER

## 2023-11-29 PROCEDURE — 160025 RECOVERY II MINUTES (STATS): Performed by: ORTHOPAEDIC SURGERY

## 2023-11-29 PROCEDURE — 160048 HCHG OR STATISTICAL LEVEL 1-5: Performed by: ORTHOPAEDIC SURGERY

## 2023-11-29 PROCEDURE — 700111 HCHG RX REV CODE 636 W/ 250 OVERRIDE (IP): Performed by: STUDENT IN AN ORGANIZED HEALTH CARE EDUCATION/TRAINING PROGRAM

## 2023-11-29 PROCEDURE — 64447 NJX AA&/STRD FEMORAL NRV IMG: CPT | Performed by: ORTHOPAEDIC SURGERY

## 2023-11-29 PROCEDURE — 160046 HCHG PACU - 1ST 60 MINS PHASE II: Performed by: ORTHOPAEDIC SURGERY

## 2023-11-29 PROCEDURE — 700101 HCHG RX REV CODE 250: Performed by: STUDENT IN AN ORGANIZED HEALTH CARE EDUCATION/TRAINING PROGRAM

## 2023-11-29 PROCEDURE — 160002 HCHG RECOVERY MINUTES (STAT): Performed by: ORTHOPAEDIC SURGERY

## 2023-11-29 PROCEDURE — 73630 X-RAY EXAM OF FOOT: CPT | Mod: RT

## 2023-11-29 PROCEDURE — 28730 FUSION OF FOOT BONES: CPT | Mod: RT | Performed by: ORTHOPAEDIC SURGERY

## 2023-11-29 PROCEDURE — 700105 HCHG RX REV CODE 258: Performed by: ORTHOPAEDIC SURGERY

## 2023-11-29 PROCEDURE — 20902 REMOVAL OF BONE FOR GRAFT: CPT | Mod: RT | Performed by: ORTHOPAEDIC SURGERY

## 2023-11-29 PROCEDURE — C1713 ANCHOR/SCREW BN/BN,TIS/BN: HCPCS | Performed by: ORTHOPAEDIC SURGERY

## 2023-11-29 DEVICE — SCREW BONE T10 FULL THREAD L28 MM OD3.5 MM LOCK STARDRIVE NONSTERILE VARIAX FOOT PLATE SYSTEM: Type: IMPLANTABLE DEVICE | Site: FOOT | Status: FUNCTIONAL

## 2023-11-29 DEVICE — SCREW BONE T8 FULL THREAD L30 MM OD2.7 MM LOCK STARDRIVE NONSTERILE VARIAX FOOT PLATE SYSTEM: Type: IMPLANTABLE DEVICE | Site: FOOT | Status: FUNCTIONAL

## 2023-11-29 DEVICE — SCREW BONE T8 FULL THREAD L18 MM OD2.7 MM STARDRIVE NONSTERILE VARIAX FOOT PLATE SYSTEM: Type: IMPLANTABLE DEVICE | Site: FOOT | Status: FUNCTIONAL

## 2023-11-29 DEVICE — SCREW BONE T8 FULL THREAD L28 MM OD2.7 MM LOCK STARDRIVE NONSTERILE VARIAX FOOT PLATE SYSTEM: Type: IMPLANTABLE DEVICE | Site: FOOT | Status: FUNCTIONAL

## 2023-11-29 DEVICE — SCREW BONE VARIAX T10 FULL THREAD L22 MM OD3.5 MM FOOT ANKLE LOCK STARDRIVE NONSTERILE: Type: IMPLANTABLE DEVICE | Site: FOOT | Status: FUNCTIONAL

## 2023-11-29 DEVICE — SCREW BONE VARIAX T10 FULL THREAD L24 MM OD3.5 MM FOOT ANKLE LOCK STARDRIVE NONSTERILE: Type: IMPLANTABLE DEVICE | Site: FOOT | Status: FUNCTIONAL

## 2023-11-29 DEVICE — SCREW BONE T10 FULL THREAD L34 MM OD3.5 MM STARDRIVE NONSTERILE VARIAX FOOT PLATE SYSTEM: Type: IMPLANTABLE DEVICE | Site: FOOT | Status: FUNCTIONAL

## 2023-11-29 DEVICE — PLATE BONE VARIAX BROAD Y 3 HOLE NONSTERILE: Type: IMPLANTABLE DEVICE | Site: FOOT | Status: FUNCTIONAL

## 2023-11-29 DEVICE — SCREW BONE VARIAX T8 FULL THREAD L14 MM OD2.7 MM FOOT ANKLE STARDRIVE NONSTERILE: Type: IMPLANTABLE DEVICE | Site: FOOT | Status: FUNCTIONAL

## 2023-11-29 DEVICE — SCREW BONE T8 FULL THREAD L24 MM OD2.7 MM STARDRIVE NONSTERILE VARIAX FOOT PLATE SYSTEM: Type: IMPLANTABLE DEVICE | Site: FOOT | Status: FUNCTIONAL

## 2023-11-29 DEVICE — SCREW BONE T10 FULL THREAD L32 MM OD3.5 MM STARDRIVE NONSTERILE VARIAX FOOT PLATE SYSTEM: Type: IMPLANTABLE DEVICE | Site: FOOT | Status: FUNCTIONAL

## 2023-11-29 DEVICE — IMPLANTABLE DEVICE: Type: IMPLANTABLE DEVICE | Site: FOOT | Status: FUNCTIONAL

## 2023-11-29 DEVICE — SCREW BONE VARIAX T10 FULL THREAD L18 MM OD3.5 MM FOOT ANKLE STARDRIVE NONSTERILE: Type: IMPLANTABLE DEVICE | Site: FOOT | Status: FUNCTIONAL

## 2023-11-29 DEVICE — SCREW BONE VARIAX T10 FULL THREAD L26 MM OD3.5 MM FOOT ANKLE STARDRIVE NONSTERILE: Type: IMPLANTABLE DEVICE | Site: FOOT | Status: FUNCTIONAL

## 2023-11-29 RX ORDER — ONDANSETRON 2 MG/ML
4 INJECTION INTRAMUSCULAR; INTRAVENOUS
Status: DISCONTINUED | OUTPATIENT
Start: 2023-11-29 | End: 2023-11-29 | Stop reason: HOSPADM

## 2023-11-29 RX ORDER — ROPIVACAINE HYDROCHLORIDE 5 MG/ML
INJECTION, SOLUTION EPIDURAL; INFILTRATION; PERINEURAL
Status: COMPLETED | OUTPATIENT
Start: 2023-11-29 | End: 2023-11-29

## 2023-11-29 RX ORDER — HYDROMORPHONE HYDROCHLORIDE 1 MG/ML
0.2 INJECTION, SOLUTION INTRAMUSCULAR; INTRAVENOUS; SUBCUTANEOUS
Status: DISCONTINUED | OUTPATIENT
Start: 2023-11-29 | End: 2023-11-29 | Stop reason: HOSPADM

## 2023-11-29 RX ORDER — DEXAMETHASONE SODIUM PHOSPHATE 4 MG/ML
INJECTION, SOLUTION INTRA-ARTICULAR; INTRALESIONAL; INTRAMUSCULAR; INTRAVENOUS; SOFT TISSUE PRN
Status: DISCONTINUED | OUTPATIENT
Start: 2023-11-29 | End: 2023-11-29 | Stop reason: SURG

## 2023-11-29 RX ORDER — SODIUM CHLORIDE, SODIUM LACTATE, POTASSIUM CHLORIDE, CALCIUM CHLORIDE 600; 310; 30; 20 MG/100ML; MG/100ML; MG/100ML; MG/100ML
INJECTION, SOLUTION INTRAVENOUS CONTINUOUS
Status: DISCONTINUED | OUTPATIENT
Start: 2023-11-29 | End: 2023-11-29 | Stop reason: HOSPADM

## 2023-11-29 RX ORDER — DEXAMETHASONE SODIUM PHOSPHATE 4 MG/ML
INJECTION, SOLUTION INTRA-ARTICULAR; INTRALESIONAL; INTRAMUSCULAR; INTRAVENOUS; SOFT TISSUE
Status: COMPLETED | OUTPATIENT
Start: 2023-11-29 | End: 2023-11-29

## 2023-11-29 RX ORDER — EPHEDRINE SULFATE 50 MG/ML
INJECTION, SOLUTION INTRAVENOUS PRN
Status: DISCONTINUED | OUTPATIENT
Start: 2023-11-29 | End: 2023-11-29 | Stop reason: SURG

## 2023-11-29 RX ORDER — OXYCODONE HCL 5 MG/5 ML
10 SOLUTION, ORAL ORAL
Status: COMPLETED | OUTPATIENT
Start: 2023-11-29 | End: 2023-11-29

## 2023-11-29 RX ORDER — HALOPERIDOL 5 MG/ML
1 INJECTION INTRAMUSCULAR
Status: DISCONTINUED | OUTPATIENT
Start: 2023-11-29 | End: 2023-11-29 | Stop reason: HOSPADM

## 2023-11-29 RX ORDER — LEVOTHYROXINE SODIUM 175 UG/1
175 TABLET ORAL SEE ADMIN INSTRUCTIONS
COMMUNITY

## 2023-11-29 RX ORDER — MIDAZOLAM HYDROCHLORIDE 1 MG/ML
INJECTION INTRAMUSCULAR; INTRAVENOUS PRN
Status: DISCONTINUED | OUTPATIENT
Start: 2023-11-29 | End: 2023-11-29 | Stop reason: SURG

## 2023-11-29 RX ORDER — CEFAZOLIN SODIUM 1 G/3ML
2 INJECTION, POWDER, FOR SOLUTION INTRAMUSCULAR; INTRAVENOUS ONCE
Status: COMPLETED | OUTPATIENT
Start: 2023-11-29 | End: 2023-11-29

## 2023-11-29 RX ORDER — SODIUM CHLORIDE, SODIUM LACTATE, POTASSIUM CHLORIDE, CALCIUM CHLORIDE 600; 310; 30; 20 MG/100ML; MG/100ML; MG/100ML; MG/100ML
INJECTION, SOLUTION INTRAVENOUS CONTINUOUS
Status: DISCONTINUED | OUTPATIENT
Start: 2023-11-29 | End: 2023-11-29

## 2023-11-29 RX ORDER — OXYCODONE HCL 5 MG/5 ML
5 SOLUTION, ORAL ORAL
Status: COMPLETED | OUTPATIENT
Start: 2023-11-29 | End: 2023-11-29

## 2023-11-29 RX ORDER — HYDROMORPHONE HYDROCHLORIDE 1 MG/ML
0.1 INJECTION, SOLUTION INTRAMUSCULAR; INTRAVENOUS; SUBCUTANEOUS
Status: DISCONTINUED | OUTPATIENT
Start: 2023-11-29 | End: 2023-11-29 | Stop reason: HOSPADM

## 2023-11-29 RX ORDER — HYDROMORPHONE HYDROCHLORIDE 1 MG/ML
0.4 INJECTION, SOLUTION INTRAMUSCULAR; INTRAVENOUS; SUBCUTANEOUS
Status: DISCONTINUED | OUTPATIENT
Start: 2023-11-29 | End: 2023-11-29 | Stop reason: HOSPADM

## 2023-11-29 RX ORDER — ACETAMINOPHEN 500 MG
1000 TABLET ORAL ONCE
Status: COMPLETED | OUTPATIENT
Start: 2023-11-29 | End: 2023-11-29

## 2023-11-29 RX ORDER — ONDANSETRON 2 MG/ML
INJECTION INTRAMUSCULAR; INTRAVENOUS PRN
Status: DISCONTINUED | OUTPATIENT
Start: 2023-11-29 | End: 2023-11-29 | Stop reason: SURG

## 2023-11-29 RX ADMIN — DEXAMETHASONE SODIUM PHOSPHATE 6 MG: 4 INJECTION INTRA-ARTICULAR; INTRALESIONAL; INTRAMUSCULAR; INTRAVENOUS; SOFT TISSUE at 16:05

## 2023-11-29 RX ADMIN — DEXAMETHASONE SODIUM PHOSPHATE 0.6 MG: 4 INJECTION, SOLUTION INTRA-ARTICULAR; INTRALESIONAL; INTRAMUSCULAR; INTRAVENOUS; SOFT TISSUE at 15:54

## 2023-11-29 RX ADMIN — ROPIVACAINE HYDROCHLORIDE 15 ML: 5 INJECTION EPIDURAL; INFILTRATION; PERINEURAL at 15:50

## 2023-11-29 RX ADMIN — MIDAZOLAM HYDROCHLORIDE 1 MG: 1 INJECTION, SOLUTION INTRAMUSCULAR; INTRAVENOUS at 15:49

## 2023-11-29 RX ADMIN — FENTANYL CITRATE 25 MCG: 50 INJECTION, SOLUTION INTRAMUSCULAR; INTRAVENOUS at 16:04

## 2023-11-29 RX ADMIN — EPHEDRINE SULFATE 10 MG: 50 INJECTION, SOLUTION INTRAVENOUS at 16:42

## 2023-11-29 RX ADMIN — FENTANYL CITRATE 25 MCG: 50 INJECTION, SOLUTION INTRAMUSCULAR; INTRAVENOUS at 15:49

## 2023-11-29 RX ADMIN — ROPIVACAINE HYDROCHLORIDE 10 ML: 5 INJECTION, SOLUTION EPIDURAL; INFILTRATION; PERINEURAL at 15:54

## 2023-11-29 RX ADMIN — PROPOFOL 130 MG: 10 INJECTION, EMULSION INTRAVENOUS at 15:59

## 2023-11-29 RX ADMIN — DEXAMETHASONE SODIUM PHOSPHATE 1 MG: 4 INJECTION, SOLUTION INTRA-ARTICULAR; INTRALESIONAL; INTRAMUSCULAR; INTRAVENOUS; SOFT TISSUE at 15:50

## 2023-11-29 RX ADMIN — CEFAZOLIN 2 G: 1 INJECTION, POWDER, FOR SOLUTION INTRAMUSCULAR; INTRAVENOUS at 16:05

## 2023-11-29 RX ADMIN — SODIUM CHLORIDE, POTASSIUM CHLORIDE, SODIUM LACTATE AND CALCIUM CHLORIDE: 600; 310; 30; 20 INJECTION, SOLUTION INTRAVENOUS at 14:44

## 2023-11-29 RX ADMIN — ONDANSETRON 4 MG: 2 INJECTION INTRAMUSCULAR; INTRAVENOUS at 16:05

## 2023-11-29 RX ADMIN — ACETAMINOPHEN 1000 MG: 500 TABLET ORAL at 14:46

## 2023-11-29 ASSESSMENT — PAIN DESCRIPTION - PAIN TYPE
TYPE: ACUTE PAIN
TYPE: SURGICAL PAIN

## 2023-11-29 NOTE — ANESTHESIA PREPROCEDURE EVALUATION
Case: [id number] Date/Time: 11/29/23 1515    Procedures:       RIGHT LISFRANC OPEN REDUCTION INTERNAL FIXATION, 1/2 TARSOMETATARSAL FUSION, CALCANEAL AUTOGRAFT (Right: Foot)      BONE GRAFT (Right: Foot)    Anesthesia type: General    Diagnosis: Right foot pain [M79.671]    Pre-op diagnosis: Right foot pain    Location: [facility] OR 12 / SURGERY [facility]    Surgeons: Lambert Harrison M.D.            Relevant Problems   CARDIAC   (positive) Essential hypertension      ENDO   (positive) Hypothyroid   (positive) Postsurgical hypothyroidism      Other   (positive) Arthritis       Physical Exam    Anesthesia Plan    ASA 2       Plan - general               Induction: intravenous    Postoperative Plan: Postoperative administration of opioids is intended.    Pertinent diagnostic labs and testing reviewed    Informed Consent:    Anesthetic plan and risks discussed with patient.    Use of blood products discussed with: patient whom consented to blood products.

## 2023-11-29 NOTE — PROGRESS NOTES
Medication history reviewed with PT at bedside    [facility] is complete per PT reporting    Allergies reviewed.     Patient denies any outpatient antibiotics in the last 30 days.     Patient is not taking anticoagulants.    Preferred pharmacy for this visit - [facility] ([phone]).

## 2023-11-29 NOTE — LETTER
November 21, 2023    Patient Name: Noe Burr  Surgeon Name: Lambert Harrison M.D.  Surgery Facility: [facility] ([address] [facility])  Surgery Date: 11/29/2023    The time of your surgery is not final and may change up to and until the day of your surgery. You will be contacted 24-48 hours prior to your surgery date with your check-in and surgery time.    If you will not be at one of the below numbers please call the surgery scheduler at [phone]  Preferred Phone: [phone]    BEFORE YOUR SURGERY   Pre Registration and/or Lab Work must be done within and no earlier than 28 days prior to your surgery date. Your scheduled facility will contact you regarding all required preregistration and/or lab work. If you have not been contacted within 7 days of your scheduled procedure please call [facility] at [phone] for an appointment as soon as possible.    The [facility] offers a class for patients undergoing a total hip/knee replacement surgery scheduled at our outpatient surgery center. Information about what to expect for preparation, the day of surgery and recovery will be given. We highly recommend bringing your support for surgery with you to the class, as well as any questions you may have. If you are interested in attending the class, please call [phone] for scheduling.     Pre op Appointment:  Instructions: Bring a list of all medications you are taking including the dosing and frequency.    DAY OF YOUR SURGERY  Nothing to eat or drink after midnight     Refrain from smoking any substance after midnight prior to surgery. Smoking may interfere with the anesthetic and frequently produces nausea during the recovery period.    Continue taking all lifesaving medications. Including the morning of your surgery with small sip of water.    Please do NOT take on the day of surgery:  Diuretics: examples- furosemide (Lasix), spironolactone,  hydrochlorothiazide  ACE-inhibitors: examples- lisinopril, ramipril, enalapril  “ARBs”: examples- losartan, Olmesartan, valsartan    Please arrive at the hospital/surgery center at the check-in time provided.     An adult will need to bring you and take you home after your surgery.     AFTER YOUR SURGERY  Post op Appointment:   Date: 12/12/23   Time: 02:00PM   With: Lambert Harrison MD   Location: [address] [place] [id number]    TIME OFF WORK  FMLA or Disability forms can be faxed directly to: [phone] or you may drop them off at [facility] [place] [address]. Our office charges a $35.00 fee per form. Forms will be completed within 10 business days of drop off and payment received. For the status of your forms you may contact our disability office directly [phone].    MEDICATION INSTRUCTIONS **Please read section completely**    The following medications should be stopped a minimum of 10 days prior to surgery:  All over the counter, Supplements & Herbal medications    Anorectics: Phentermine (Adipex-P, Lomaira and Suprenza), Phentermine-topiramate (Qsymia), Bupropion-naltrexone (Contrave)    Opiod Partial Agonists/Opioid Antagonists: Buprenorphine (Subocone, Belbuca, Butrans, Probuphine Implant, Sublocade), Naltrexone (ReVia, Vivitrol), Naloxone    Amphetamines: Dextroamphetamine/Amphetamine (Adderall, Mydayis), Methylphenidate Hydrochloride (Concerta, Metadate, Methylin, Ritalin)    The following medications should be stopped 5 days prior to surgery:  Blood Thinners: Any Aspirin, Aspirin products, anti-inflammatories such as ibuprofen and any blood thinners such as Coumadin and Plavix. Please consult your prescribing physician if you are on life saving blood thinners, in regards to when to stop medications prior to surgery.     The following medications should be stopped a minimum of 3 days prior to surgery:  PDE-5 inhibitors: Sildenafil (Viagra), Tadalafil (Cialis), Vardenafil  (Levitra), Avanafil (Stendra)    MAO Inhibitors: Rasagiline (Azilect), Selegiline (Eldepryl, Emsam, Selapar), Isocarboxazid (Marplan), Phenelzine (Nardil)             COVID and INFLUENZA NOTICE TO PATIENTS    Currently, the [facility] does not routinely test patients for COVID-19 or Influenza prior to their elective surgery.  However, if patients develop the following symptoms prior to their surgery date, they should voluntarily test for COVID-19 and Influenza and notify the surgical office of their condition and results.  The symptoms warranting testing would be any two of the following:    Fever (Temp above 100.4 F)  Chills  Cough  Shortness of breath or difficulty breathing  Fatigue  Myalgias (muscle or body aches)  Headache  Sore Throat  Congestion or Runny Nose  Nausea or vomiting  Diarrhea  New loss of taste or smell    Having these symptoms prior to surgery can significantly increase your risk of morbidity and mortality under anesthesia, which may compromise your health and require a transfer to a hospital for a higher level of care.  Therefore, it is advisable to notify the surgical team of any illness in order to get information for the appropriate time to delay the surgery to minimize these preventable risks.    Your health and safety are our number one priority at the [facility], and we are thankful that you entrust us with your care.  Please help us ensure the best possible surgical and anesthetic outcome by sharing appropriate health information with our perioperative team and staff.  We look forward to taking great care of you!    Thank you for your time and consideration on this matter.    Juan Sheppard MD  Medical Director  Anesthesiologist  [facility]

## 2023-11-30 NOTE — ANESTHESIA TIME REPORT
Anesthesia Start and Stop Event Times       Date Time Event    11/29/2023 1548 Ready for Procedure     1548 Anesthesia Start     1731 Anesthesia Stop          Responsible Staff  11/29/23      Name Role Begin End    Juan Madera M.D. Anesth 1548 1709    John Franz M.D. Anesth 1709 1731          Overtime Reason:  no overtime (within assigned shift)    Comments:

## 2023-11-30 NOTE — ANESTHESIA PROCEDURE NOTES
Peripheral Block    Date/Time: 11/29/2023 3:50 PM    Performed by: Juan Madera M.D.  Authorized by: Juan Madera M.D.    Patient Location:  OR  Start Time:  11/29/2023 3:50 PM  End Time:  11/29/2023 3:54 PM  Reason for Block: at surgeon's request and post-op pain management ONLY    patient identified, IV checked, site marked, risks and benefits discussed, surgical consent, monitors and equipment checked, pre-op evaluation and timeout performed    Patient Position:  Supine  Prep: ChloraPrep    Monitoring:  Heart rate, continuous pulse ox and cardiac monitor  Block Region:  Lower Extremity  Lower Extremity - Block Type:  SCIATIC nerve block, lateral approach    Laterality:  Right  Procedures: ultrasound guided  Image captured, interpreted and electronically stored.  Local Infiltration:  Lidocaine  Strength:  1 %  Dose:  3 ml  Block Type:  Single-shot  Needle Length:  100mm  Needle Gauge:  21 G  Needle Localization:  Ultrasound guidance  Ultrasound picture in chart  Injection Assessment:  Negative aspiration for heme, no paresthesia on injection, incremental injection and local visualized surrounding nerve on ultrasound  Evidence of intravascular injection: No     US Guided Sciatic Nerve Block   US probe placed several cm proximal to popliteal crease on posterior thigh and scanned caudad and cephalad until Sciatic Nerve (SN) identified superficial/lateral to popliteal artery.  Needle inserted lateral to probe in an in plane approach under direct visualization to a perineural position.  After negative aspiration LA injected with ease and visualized surrounding the SN.

## 2023-11-30 NOTE — ANESTHESIA POSTPROCEDURE EVALUATION
Patient: Noe Burr    Procedure Summary       Date: 11/29/23 Room / Location: [place] [address] / SURGERY [facility]    Anesthesia Start: 1548 Anesthesia Stop: 1731    Procedures:       RIGHT LISFRANC OPEN REDUCTION INTERNAL FIXATION, 1/2 TARSOMETATARSAL FUSION, CALCANEAL AUTOGRAFT (Right: Foot)      BONE GRAFT (Right: Foot) Diagnosis:       Right foot pain      (Right foot pain)    Surgeons: Lambert Harrison M.D. Responsible Provider: John Franz M.D.    Anesthesia Type: general ASA Status: 2            Final Anesthesia Type: general  Last vitals  BP   Blood Pressure : (!) 151/94    Temp   36.3 °C (97.3 °F)    Pulse   79   Resp   18    SpO2   96 %      Anesthesia Post Evaluation    Patient location during evaluation: PACU  Patient participation: complete - patient participated  Level of consciousness: awake and alert    Airway patency: patent  Anesthetic complications: no  Cardiovascular status: hemodynamically stable  Respiratory status: acceptable  Hydration status: euvolemic    PONV: none          There were no known notable events for this encounter.     Nurse Pain Score: 1 (NPRS)

## 2023-11-30 NOTE — DISCHARGE INSTRUCTIONS
HOME CARE INSTRUCTIONS    ACTIVITY: Rest and take it easy for the first 24 hours.  A responsible adult is recommended to remain with you during that time.  It is normal to feel sleepy.  We encourage you to not do anything that requires balance, judgment or coordination.    FOR 24 HOURS DO NOT:  Drive, operate machinery or run household appliances.  Drink beer or alcoholic beverages.  Make important decisions or sign legal documents.    SPECIAL INSTRUCTIONS:  The patient will be nonweightbearing on the right lower extremity for the next 2 weeks.    He will be seen in the office in 2 weeks time at which point nonweightbearing x-rays of the right foot will be taken, he will likely be transition into a boot to allow for range of motion while maintaining nonweightbearing for a total of 6 weeks.     Closed Reduction for Ankle Fracture or Dislocation, Care After  This sheet gives you information about how to care for yourself after your procedure. Your health care provider may also give you more specific instructions. If you have problems or questions, contact your health care provider.  What can I expect after the procedure?  After the procedure, it is common to have:  Pain or discomfort.  Swelling.  Bruising or bluish discoloration.  Follow these instructions at home:  Medicines  Take over-the-counter and prescription medicines only as told by your health care provider.  Ask your health care provider if the medicine prescribed to you:  Requires you to avoid driving or using heavy machinery.  Can cause constipation. You may need to take actions to prevent or treat constipation, such as:  Drink enough fluid to keep your urine pale yellow.  Take over-the-counter or prescription medicines.  Eat foods that are high in fiber, such as beans, whole grains, and fresh fruits and vegetables.  Limit foods that are high in fat and processed sugars, such as fried or sweet foods.  If you have a cast, splint, or boot:  Check the skin  around it every day. Tell your health care provider about any concerns.  Keep it clean.  If the cast, splint, boot is not waterproof:  Do not let it get wet.  Cover it with a watertight covering when you take a bath or shower.  If you have a cast:  Do not put pressure on any part of the cast until it is fully hardened. This may take several hours.  Do not stick anything inside the cast to scratch your skin. Doing that increases your risk of infection.  You may put lotion on dry skin around the edges of the cast. Do not put lotion on the skin underneath the cast.  If you have a splint or boot:  Wear the splint or boot as told by your health care provider. Remove it only as told by your health care provider.  Loosen the splint or boot if your toes tingle, become numb, or turn cold and blue.  Managing pain, stiffness, and swelling    If directed, put ice on your ankle area.  If you have a removable splint or boot, remove it as told by your health care provider.  Put ice in a plastic bag.  Place a towel between your skin and the bag or between your cast and the bag.  Leave the ice on for 20 minutes, 2-3 times a day.  Move your toes often to reduce stiffness and swelling.  Raise (elevate) your ankle above the level of your heart while you are sitting or lying down.  Activity  Return to your normal activities as told by your health care provider. Ask your health care provider what activities are safe for you.  Do not use the injured limb to support your body weight until your health care provider says that you can. This may take several weeks. Use crutches, a walker, or a wheelchair as told by your health care provider.  Do exercises such as specific physical therapy for your ankle as instructed by your health care provider.  Driving  Do not drive for 24 hours if you were given a sedative during your procedure.  Ask your health care provider when it is safe to drive if you have a cast, splint, or boot on the foot that you  use for driving.  General instructions  Do not drink alcohol if your health care provider tells you not to drink.  Do not use any products that contain nicotine or tobacco, such as cigarettes, e-cigarettes, and chewing tobacco. These can delay bone healing. If you need help quitting, ask your health care provider.  Keep all follow-up visits as told by your health care provider. This is important.  Contact a health care provider if you have:  A fever.  Pain that is not controlled by your pain medicine.  Get help right away if you have:  A severe increase in pain or swelling.  Toes that tingle or become numb.  Loss of feeling in your leg, foot, or ankle.  Toes that are cold and blue.  Pain, tenderness, or redness in your calf.  A new rash around your ankle or cast, or if you notice any leakage of fluid from the skin.  Bleeding around the ankle or underneath or around the cast.  Chest pain.  Difficulty breathing.  Summary  After the procedure, it is common to have pain or discomfort, swelling, and bruising or bluish discoloration around your ankle.  Follow instructions for taking care of your injury as you recover at home. You may be given specific instructions for taking medicines and wearing a boot, cast, or splint.  Use ice and medicine to control swelling and pain. Follow instructions on not bearing weight on your injured ankle. You may have to use crutches, a walker, or a wheelchair.  Contact a health care provider if you have a fever or pain that does not go away.  Get help right away if your pain worsens or your toes tingle, become numb, or turn cold or blue. Also, get help right away if you have chest pain, tenderness in your calf, a rash around your ankle or cast, or bleeding around the ankle.    DIET: To avoid nausea, slowly advance diet as tolerated, avoiding spicy or greasy foods for the first day.  Add more substantial food to your diet according to your physician's instructions.  Babies can be fed formula  or breast milk as soon as they are hungry.  INCREASE FLUIDS AND FIBER TO AVOID CONSTIPATION.    SURGICAL DRESSING/BATHING: -    MEDICATIONS: Resume taking daily medication.  Take prescribed pain medication with food.  If no medication is prescribed, you may take non-aspirin pain medication if needed.  PAIN MEDICATION CAN BE VERY CONSTIPATING.  Take a stool softener or laxative such as senokot, pericolace, or milk of magnesia if needed.    Prescription given for oxyCODONE immediate-release (ROXICODONE) 5 MG Tab .  Last pain medication given at tylenol 1 grm at 2:46PM .    A follow-up appointment should be arranged with your doctor in [phone] ; call to schedule.    You should CALL YOUR PHYSICIAN if you develop:  Fever greater than 101 degrees F.  Pain not relieved by medication, or persistent nausea or vomiting.  Excessive bleeding (blood soaking through dressing) or unexpected drainage from the wound.  Extreme redness or swelling around the incision site, drainage of pus or foul smelling drainage.  Inability to urinate or empty your bladder within 8 hours.  Problems with breathing or chest pain.    You should call 911 if you develop problems with breathing or chest pain.  If you are unable to contact your doctor or surgical center, you should go to the nearest emergency room or urgent care center.  Physician's telephone #: [phone]     MILD FLU-LIKE SYMPTOMS ARE NORMAL.  YOU MAY EXPERIENCE GENERALIZED MUSCLE ACHES, THROAT IRRITATION, HEADACHE AND/OR SOME NAUSEA.    If any questions arise, call your doctor.  If your doctor is not available, please feel free to call the Surgical Center at [phone].  The Center is open Monday through Friday from 7AM to 7PM.      A registered nurse may call you a few days after your surgery to see how you are doing after your procedure.    You may also receive a survey in the mail within the next two weeks and we ask that you take a few moments to complete the survey and  return it to us.  Our goal is to provide you with very good care and we value your comments.     Depression / Suicide Risk    As you are discharged from this [facility] facility, it is important to learn how to keep safe from harming yourself.    Recognize the warning signs:  Abrupt changes in personality, positive or negative- including increase in energy   Giving away possessions  Change in eating patterns- significant weight changes-  positive or negative  Change in sleeping patterns- unable to sleep or sleeping all the time   Unwillingness or inability to communicate  Depression  Unusual sadness, discouragement and loneliness  Talk of wanting to die  Neglect of personal appearance   Rebelliousness- reckless behavior  Withdrawal from people/activities they love  Confusion- inability to concentrate     If you or a loved one observes any of these behaviors or has concerns about self-harm, here's what you can do:  Talk about it- your feelings and reasons for harming yourself  Remove any means that you might use to hurt yourself (examples: pills, rope, extension cords, firearm)  Get professional help from the community (Mental Health, Substance Abuse, psychological counseling)  Do not be alone:Call your Safe Contact- someone whom you trust who will be there for you.  Call your local CRISIS HOTLINE 990-5427 or 458-447-0962  Call your local Children's Mobile Crisis Response Team [place] (466) 168-1577 or www.[employer]  Call the toll free National Suicide Prevention Hotlines   National Suicide Prevention Lifeline 373-484-ROPG (1515)  National Hope Line Network 800-SUICIDE (774-4339)    I acknowledge receipt and understanding of these Home Care instructions.

## 2023-11-30 NOTE — OR NURSING
1730: Pt arrived from OR, handoff received from anesthesiologist and RN. Patient asleep breathing even and unlabored. Vitals stable. RLE dressing C/D/I, toes warm/pink. Walking boot on gurney.     1740: Patient waking up, oriented x4, moving all extremities. States minimal pain, declines medication intervention at this time. Able to wiggle toes.     1750: Friend update on status. Patient sitting up, tolerating oral intake of fluids.     1830: Patient states pain level unchanged, tolerable.     1835: Report given to phase 2 RNMary Jo.

## 2023-11-30 NOTE — ANESTHESIA PROCEDURE NOTES
Peripheral Block    Date/Time: 11/29/2023 3:54 PM    Performed by: Juan Madera M.D.  Authorized by: Juan Madera M.D.    Patient Location:  OR  Start Time:  11/29/2023 3:54 PM  End Time:  11/29/2023 3:57 PM  Reason for Block: at surgeon's request and post-op pain management ONLY    patient identified, IV checked, site marked, risks and benefits discussed, surgical consent, monitors and equipment checked, pre-op evaluation and timeout performed    Patient Position:  Supine  Prep: ChloraPrep    Monitoring:  Heart rate, continuous pulse ox and cardiac monitor  Block Region:  Lower Extremity  Lower Extremity - Block Type:  Selective FEMORAL nerve block at the Adductor Canal    Laterality:  Right  Procedures: ultrasound guided  Image captured, interpreted and electronically stored.  Local Infiltration:  Lidocaine  Strength:  1 %  Dose:  3 ml  Block Type:  Single-shot  Needle Length:  100mm  Needle Gauge:  21 G  Needle Localization:  Ultrasound guidance  Ultrasound picture in chart  Injection Assessment:  Negative aspiration for heme, no paresthesia on injection, incremental injection and local visualized surrounding nerve on ultrasound  Evidence of intravascular injection: No     US Guided Selective Femoral Nerve Block at Adductor Canal:   US probe placed at mid-thigh level on externally rotated leg and femur identified.  Probe directed medially until Sartorius Muscle (SM), Femoral Artery (FA) and Saphenous Nerve (SN) identified in Adductor Canal (AC).  Needle inserted anterolateral to probe in an in plane approach into a subsartorial perivascular perineural position.  After negative aspiration LA injected with ease and visualized spreading within the AC.
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
## 2023-11-30 NOTE — OR NURSING
Asssumed care in pre op patient is fully awake oriented x 4 . Surgical right foot is clean dry and intact , elevated. Skin color is pink warm . Unable to wiggle toes no sensation on the toes post nerve block. V/s taken and recorded.   Belongings brought back and given to the wife.   Pt requested to rest for a while.
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
## (undated) DEVICE — ELECTRODE DUAL RETURN W/ CORD - (50/PK)

## (undated) DEVICE — SET LEADWIRE 5 LEAD BEDSIDE DISPOSABLE ECG (1SET OF 5/EA)

## (undated) DEVICE — TOWELS CLOTH SURGICAL - (4/PK 20PK/CA)

## (undated) DEVICE — TUBING CLEARLINK DUO-VENT - C-FLO (48EA/CA)

## (undated) DEVICE — BLADE SURGICAL #15 - (50/BX 3BX/CA)

## (undated) DEVICE — ELECTRODE 850 FOAM ADHESIVE - HYDROGEL RADIOTRNSPRNT (50/PK)

## (undated) DEVICE — DRAPE LARGE 3 QUARTER - (20/CA)

## (undated) DEVICE — PADDING CAST 6 IN STERILE - 6 X 4 YDS (24/CA)

## (undated) DEVICE — SLEEVE VASO CALF MED - (10PR/CA)

## (undated) DEVICE — DRAPE C-ARM LARGE 41IN X 74 IN - (10/BX 2BX/CA)

## (undated) DEVICE — BIT DRILL DIA2MM SCALED FOR VARIAX 2 WRIST FUSION LOCKING PLATE SYSTEM

## (undated) DEVICE — SUTURE GENERAL

## (undated) DEVICE — GOWN SURGEONS X-LARGE - DISP. (30/CA)

## (undated) DEVICE — LACTATED RINGERS INJ 1000 ML - (14EA/CA 60CA/PF)

## (undated) DEVICE — CANISTER SUCTION 3000ML MECHANICAL FILTER AUTO SHUTOFF MEDI-VAC NONSTERILE LF DISP  (40EA/CA)

## (undated) DEVICE — DRAPE C ARMOR (12EA/CA)

## (undated) DEVICE — PACK LOWER EXTREMITY - (2/CA)

## (undated) DEVICE — SET EXTENSION WITH 2 PORTS (48EA/CA) ***PART #2C8610 IS A SUBSTITUTE*****

## (undated) DEVICE — PROTECTOR ULNA NERVE - (36PR/CA)

## (undated) DEVICE — DRILL BIT 5.0 CANN. 300MM - (2CSX1=2)

## (undated) DEVICE — SUTURE 2-0 VICRYL PLUS CT-1 - 8 X 18 INCH(12/BX)

## (undated) DEVICE — TRAY CATHETER FOLEY URINE METER W/STATLOCK 350ML (10EA/CA)

## (undated) DEVICE — SPLINT PLASTER 5 IN X 30 IN - (50EA/BX 6BX/CA)

## (undated) DEVICE — DRESSING 3X8 ADAPTIC GAUZE - NON-ADHERING (36/PK 6PK/BX)

## (undated) DEVICE — DRAPE SURG STERI-DRAPE 7X11OD - (40EA/CA)

## (undated) DEVICE — DRESSING TRANSPARENT FILM TEGADERM 4 X 4.75" (50EA/BX)"

## (undated) DEVICE — STAPLER SKIN DISP - (6/BX 10BX/CA) VISISTAT

## (undated) DEVICE — DRAPE 36X28IN RAD CARM BND BG - (25/CA) O

## (undated) DEVICE — WIRE GUIDE SYN 2.8 300 CALIB. (2CSX10=20)

## (undated) DEVICE — DRAPE SURGICAL U 77X120 - (10/CA)

## (undated) DEVICE — SUCTION INSTRUMENT YANKAUER BULBOUS TIP W/O VENT (50EA/CA)

## (undated) DEVICE — BLADE SURGICAL CLIPPER - (50EA/CA)

## (undated) DEVICE — BOVIE BLADE COATED - (50/PK)

## (undated) DEVICE — SUTURE 3-0 ETHILON PS-1 (36PK/BX)

## (undated) DEVICE — CHLORAPREP 26 ML APPLICATOR - ORANGE TINT(25/CA)

## (undated) DEVICE — BLANKET WARMING UPPER BODY - (10/CA)

## (undated) DEVICE — HEAD HOLDER JUNIOR/ADULT

## (undated) DEVICE — SLEEVE, VASO, THIGH, MED

## (undated) DEVICE — WRAP CO-FLEX 4IN X 5YD STERIL - SELF-ADHERENT (18/CA)

## (undated) DEVICE — GLOVE BIOGEL INDICATOR SZ 6.5 SURGICAL PF LTX - (50PR/BX 4BX/CA)

## (undated) DEVICE — MASK ANESTHESIA ADULT  - (100/CA)

## (undated) DEVICE — GOWN WARMING STANDARD FLEX - (30/CA)

## (undated) DEVICE — DRAPE STRLE REG TOWEL 18X24 - (10/BX 4BX/CA)"

## (undated) DEVICE — SUTURE 3-0 VICRYL PLUS SH - 27 INCH (36/BX)

## (undated) DEVICE — BIT DRILL DIA2.6MM SCALED FOR VARIAX 2 WRIST FUSION LOCKING PLATE SYSTEM

## (undated) DEVICE — WIRE GUIDE SYN 2.8 300MM TROC (2CSX10+COLLNRX5=25)

## (undated) DEVICE — SODIUM CHL IRRIGATION 0.9% 1000ML (12EA/CA)

## (undated) DEVICE — SENSOR SPO2 NEO LNCS ADHESIVE (20/BX) SEE USER NOTES

## (undated) DEVICE — GLOVE BIOGEL ECLIPSE PF LATEX SIZE 8.0  (50PR/BX)

## (undated) DEVICE — STOCKINET BIAS 6 IN STERILE - (20/CA)

## (undated) DEVICE — DRAPE U ORTHOPEDIC - (10/BX)

## (undated) DEVICE — DRAPE IOBAN II INCISE 23X17 - (10EA/BX 4BX/CA)

## (undated) DEVICE — DRESSING ABDOMINAL PAD STERILE 8 X 10" (360EA/CA)"

## (undated) DEVICE — PAD LAP STERILE 18 X 18 - (5/PK 40PK/CA)

## (undated) DEVICE — GLOVE BIOGEL INDICATOR SZ 7.5 SURGICAL PF LTX - (50PR/BX 4BX/CA)

## (undated) DEVICE — NEPTUNE 4 PORT MANIFOLD - (20/PK)

## (undated) DEVICE — DRESSING XEROFORM 1X8 - (50/BX 4BX/CA)

## (undated) DEVICE — KIT ANESTHESIA W/CIRCUIT & 3/LT BAG W/FILTER (20EA/CA)

## (undated) DEVICE — GLOVE BIOGEL PI INDICATOR SZ 8.0 SURGICAL PF LF -(50/BX 4BX/CA)

## (undated) DEVICE — PACK MAJOR BASIN - (2EA/CA)

## (undated) DEVICE — KIT ROOM DECONTAMINATION

## (undated) DEVICE — GLOVE BIOGEL SZ 6.5 SURGICAL PF LTX (50PR/BX 4BX/CA)

## (undated) DEVICE — COVER LIGHT HANDLE ALC PLUS DISP (18EA/BX)

## (undated) DEVICE — SENSOR OXIMETER ADULT SPO2 RD SET (20EA/BX)

## (undated) DEVICE — SPONGE GAUZESTER 4 X 4 4PLY - (128PK/CA)

## (undated) DEVICE — GLOVE BIOGEL PI INDICATOR SZ 7.0 SURGICAL PF LF - (50/BX 4BX/CA)

## (undated) DEVICE — TOWEL STOP TIMEOUT SAFETY FLAG (40EA/CA)

## (undated) DEVICE — TUBE E-T HI-LO CUFF 7.0MM (10EA/PK)